# Patient Record
Sex: FEMALE | Race: WHITE | NOT HISPANIC OR LATINO | Employment: UNEMPLOYED | URBAN - METROPOLITAN AREA
[De-identification: names, ages, dates, MRNs, and addresses within clinical notes are randomized per-mention and may not be internally consistent; named-entity substitution may affect disease eponyms.]

---

## 2017-04-03 ENCOUNTER — TRANSFERRED RECORDS (OUTPATIENT)
Dept: HEALTH INFORMATION MANAGEMENT | Facility: CLINIC | Age: 67
End: 2017-04-03

## 2017-04-19 ENCOUNTER — MEDICAL CORRESPONDENCE (OUTPATIENT)
Dept: HEALTH INFORMATION MANAGEMENT | Facility: CLINIC | Age: 67
End: 2017-04-19

## 2018-04-04 ENCOUNTER — TRANSFERRED RECORDS (OUTPATIENT)
Dept: HEALTH INFORMATION MANAGEMENT | Facility: CLINIC | Age: 68
End: 2018-04-04

## 2018-04-11 ENCOUNTER — TRANSFERRED RECORDS (OUTPATIENT)
Dept: HEALTH INFORMATION MANAGEMENT | Facility: CLINIC | Age: 68
End: 2018-04-11

## 2018-04-13 ENCOUNTER — TRANSFERRED RECORDS (OUTPATIENT)
Dept: HEALTH INFORMATION MANAGEMENT | Facility: CLINIC | Age: 68
End: 2018-04-13

## 2018-04-14 ENCOUNTER — HOSPITAL ENCOUNTER (INPATIENT)
Facility: CLINIC | Age: 68
LOS: 1 days | Discharge: HOME | DRG: 287 | End: 2018-04-15
Attending: EMERGENCY MEDICINE | Admitting: INTERNAL MEDICINE
Payer: COMMERCIAL

## 2018-04-14 ENCOUNTER — APPOINTMENT (OUTPATIENT)
Dept: GENERAL RADIOLOGY | Facility: CLINIC | Age: 68
DRG: 287 | End: 2018-04-14
Attending: EMERGENCY MEDICINE
Payer: COMMERCIAL

## 2018-04-14 ENCOUNTER — APPOINTMENT (OUTPATIENT)
Dept: CARDIOLOGY | Facility: CLINIC | Age: 68
DRG: 287 | End: 2018-04-14
Attending: INTERNAL MEDICINE
Payer: COMMERCIAL

## 2018-04-14 DIAGNOSIS — R07.9 CHEST PAIN, UNSPECIFIED TYPE: ICD-10-CM

## 2018-04-14 DIAGNOSIS — I24.9 ACS (ACUTE CORONARY SYNDROME) (H): Primary | ICD-10-CM

## 2018-04-14 LAB
ANION GAP SERPL CALCULATED.3IONS-SCNC: 10 MMOL/L (ref 3–14)
BASOPHILS # BLD AUTO: 0 10E9/L (ref 0–0.2)
BASOPHILS NFR BLD AUTO: 0.3 %
BUN SERPL-MCNC: 8 MG/DL (ref 7–30)
CALCIUM SERPL-MCNC: 8.4 MG/DL (ref 8.5–10.1)
CHLORIDE SERPL-SCNC: 112 MMOL/L (ref 94–109)
CO2 SERPL-SCNC: 21 MMOL/L (ref 20–32)
CREAT SERPL-MCNC: 0.66 MG/DL (ref 0.52–1.04)
CRP SERPL-MCNC: <2.9 MG/L (ref 0–8)
DIFFERENTIAL METHOD BLD: NORMAL
EOSINOPHIL # BLD AUTO: 0.2 10E9/L (ref 0–0.7)
EOSINOPHIL NFR BLD AUTO: 2.5 %
ERYTHROCYTE [DISTWIDTH] IN BLOOD BY AUTOMATED COUNT: 12.6 % (ref 10–15)
GFR SERPL CREATININE-BSD FRML MDRD: 90 ML/MIN/1.7M2
GLUCOSE SERPL-MCNC: 105 MG/DL (ref 70–99)
HCT VFR BLD AUTO: 39.5 % (ref 35–47)
HGB BLD-MCNC: 12.9 G/DL (ref 11.7–15.7)
IMM GRANULOCYTES # BLD: 0 10E9/L (ref 0–0.4)
IMM GRANULOCYTES NFR BLD: 0.1 %
LYMPHOCYTES # BLD AUTO: 1.1 10E9/L (ref 0.8–5.3)
LYMPHOCYTES NFR BLD AUTO: 14.6 %
MCH RBC QN AUTO: 31.1 PG (ref 26.5–33)
MCHC RBC AUTO-ENTMCNC: 32.7 G/DL (ref 31.5–36.5)
MCV RBC AUTO: 95 FL (ref 78–100)
MONOCYTES # BLD AUTO: 0.7 10E9/L (ref 0–1.3)
MONOCYTES NFR BLD AUTO: 8.8 %
NEUTROPHILS # BLD AUTO: 5.6 10E9/L (ref 1.6–8.3)
NEUTROPHILS NFR BLD AUTO: 73.7 %
NRBC # BLD AUTO: 0 10*3/UL
NRBC BLD AUTO-RTO: 0 /100
NT-PROBNP SERPL-MCNC: 648 PG/ML (ref 0–900)
PLATELET # BLD AUTO: 261 10E9/L (ref 150–450)
POTASSIUM SERPL-SCNC: 3.8 MMOL/L (ref 3.4–5.3)
RBC # BLD AUTO: 4.15 10E12/L (ref 3.8–5.2)
SODIUM SERPL-SCNC: 143 MMOL/L (ref 133–144)
TROPONIN I BLD-MCNC: 1.38 UG/L (ref 0–0.1)
TROPONIN I SERPL-MCNC: 1.15 UG/L (ref 0–0.04)
TROPONIN I SERPL-MCNC: 1.34 UG/L (ref 0–0.04)
WBC # BLD AUTO: 7.6 10E9/L (ref 4–11)

## 2018-04-14 PROCEDURE — 25000128 H RX IP 250 OP 636: Performed by: INTERNAL MEDICINE

## 2018-04-14 PROCEDURE — 85025 COMPLETE CBC W/AUTO DIFF WBC: CPT | Performed by: EMERGENCY MEDICINE

## 2018-04-14 PROCEDURE — 93005 ELECTROCARDIOGRAM TRACING: CPT | Performed by: EMERGENCY MEDICINE

## 2018-04-14 PROCEDURE — 93010 ELECTROCARDIOGRAM REPORT: CPT | Mod: Z6 | Performed by: EMERGENCY MEDICINE

## 2018-04-14 PROCEDURE — 36415 COLL VENOUS BLD VENIPUNCTURE: CPT | Performed by: EMERGENCY MEDICINE

## 2018-04-14 PROCEDURE — 21400006 ZZH R&B CCU INTERMEDIATE UMMC

## 2018-04-14 PROCEDURE — 27210742 ZZH CATH CR1

## 2018-04-14 PROCEDURE — 27211089 ZZH KIT ACIST INJECTOR CR3

## 2018-04-14 PROCEDURE — 25000125 ZZHC RX 250: Performed by: INTERNAL MEDICINE

## 2018-04-14 PROCEDURE — C1894 INTRO/SHEATH, NON-LASER: HCPCS

## 2018-04-14 PROCEDURE — C1769 GUIDE WIRE: HCPCS

## 2018-04-14 PROCEDURE — 99285 EMERGENCY DEPT VISIT HI MDM: CPT | Mod: 25 | Performed by: EMERGENCY MEDICINE

## 2018-04-14 PROCEDURE — 99222 1ST HOSP IP/OBS MODERATE 55: CPT | Mod: 25 | Performed by: INTERNAL MEDICINE

## 2018-04-14 PROCEDURE — 83880 ASSAY OF NATRIURETIC PEPTIDE: CPT | Performed by: EMERGENCY MEDICINE

## 2018-04-14 PROCEDURE — 84484 ASSAY OF TROPONIN QUANT: CPT

## 2018-04-14 PROCEDURE — 93458 L HRT ARTERY/VENTRICLE ANGIO: CPT | Mod: 26 | Performed by: INTERNAL MEDICINE

## 2018-04-14 PROCEDURE — B2111ZZ FLUOROSCOPY OF MULTIPLE CORONARY ARTERIES USING LOW OSMOLAR CONTRAST: ICD-10-PCS | Performed by: INTERNAL MEDICINE

## 2018-04-14 PROCEDURE — 25000132 ZZH RX MED GY IP 250 OP 250 PS 637: Performed by: STUDENT IN AN ORGANIZED HEALTH CARE EDUCATION/TRAINING PROGRAM

## 2018-04-14 PROCEDURE — 27210982 ZZH KIT RT HC TOTES DISP CR7

## 2018-04-14 PROCEDURE — 93458 L HRT ARTERY/VENTRICLE ANGIO: CPT

## 2018-04-14 PROCEDURE — 27210787 ZZH MANIFOLD CR2

## 2018-04-14 PROCEDURE — 80048 BASIC METABOLIC PNL TOTAL CA: CPT | Performed by: EMERGENCY MEDICINE

## 2018-04-14 PROCEDURE — 71045 X-RAY EXAM CHEST 1 VIEW: CPT

## 2018-04-14 PROCEDURE — 86140 C-REACTIVE PROTEIN: CPT | Performed by: EMERGENCY MEDICINE

## 2018-04-14 PROCEDURE — 25000132 ZZH RX MED GY IP 250 OP 250 PS 637: Performed by: INTERNAL MEDICINE

## 2018-04-14 PROCEDURE — 84484 ASSAY OF TROPONIN QUANT: CPT | Performed by: EMERGENCY MEDICINE

## 2018-04-14 PROCEDURE — 93454 CORONARY ARTERY ANGIO S&I: CPT

## 2018-04-14 PROCEDURE — 99152 MOD SED SAME PHYS/QHP 5/>YRS: CPT

## 2018-04-14 PROCEDURE — 99285 EMERGENCY DEPT VISIT HI MDM: CPT | Performed by: EMERGENCY MEDICINE

## 2018-04-14 RX ORDER — FLUMAZENIL 0.1 MG/ML
0.2 INJECTION, SOLUTION INTRAVENOUS
Status: ACTIVE | OUTPATIENT
Start: 2018-04-14 | End: 2018-04-15

## 2018-04-14 RX ORDER — ASPIRIN 325 MG
325 TABLET ORAL
Status: DISCONTINUED | OUTPATIENT
Start: 2018-04-14 | End: 2018-04-14 | Stop reason: HOSPADM

## 2018-04-14 RX ORDER — TAMSULOSIN HYDROCHLORIDE 0.4 MG/1
0.4 CAPSULE ORAL DAILY
Status: DISCONTINUED | OUTPATIENT
Start: 2018-04-15 | End: 2018-04-15 | Stop reason: HOSPADM

## 2018-04-14 RX ORDER — HEPARIN SODIUM 1000 [USP'U]/ML
1000-10000 INJECTION, SOLUTION INTRAVENOUS; SUBCUTANEOUS EVERY 5 MIN PRN
Status: DISCONTINUED | OUTPATIENT
Start: 2018-04-14 | End: 2018-04-14 | Stop reason: HOSPADM

## 2018-04-14 RX ORDER — BUPROPION HYDROCHLORIDE 100 MG/1
75 TABLET, EXTENDED RELEASE ORAL
COMMUNITY
Start: 2017-05-01

## 2018-04-14 RX ORDER — ASPIRIN 81 MG/1
81 TABLET, CHEWABLE ORAL DAILY
Status: DISCONTINUED | OUTPATIENT
Start: 2018-04-15 | End: 2018-04-15 | Stop reason: HOSPADM

## 2018-04-14 RX ORDER — POTASSIUM CHLORIDE 29.8 MG/ML
20 INJECTION INTRAVENOUS
Status: DISCONTINUED | OUTPATIENT
Start: 2018-04-14 | End: 2018-04-14 | Stop reason: HOSPADM

## 2018-04-14 RX ORDER — PRASUGREL 10 MG/1
10-60 TABLET, FILM COATED ORAL
Status: DISCONTINUED | OUTPATIENT
Start: 2018-04-14 | End: 2018-04-14 | Stop reason: HOSPADM

## 2018-04-14 RX ORDER — DOPAMINE HYDROCHLORIDE 160 MG/100ML
2-20 INJECTION, SOLUTION INTRAVENOUS CONTINUOUS PRN
Status: DISCONTINUED | OUTPATIENT
Start: 2018-04-14 | End: 2018-04-14 | Stop reason: HOSPADM

## 2018-04-14 RX ORDER — POTASSIUM CHLORIDE 7.45 MG/ML
10 INJECTION INTRAVENOUS
Status: DISCONTINUED | OUTPATIENT
Start: 2018-04-14 | End: 2018-04-14 | Stop reason: HOSPADM

## 2018-04-14 RX ORDER — HYDRALAZINE HYDROCHLORIDE 20 MG/ML
10-20 INJECTION INTRAMUSCULAR; INTRAVENOUS
Status: DISCONTINUED | OUTPATIENT
Start: 2018-04-14 | End: 2018-04-14 | Stop reason: HOSPADM

## 2018-04-14 RX ORDER — TAMSULOSIN HYDROCHLORIDE 0.4 MG/1
0.4 CAPSULE ORAL
COMMUNITY
Start: 2016-01-25 | End: 2022-11-01

## 2018-04-14 RX ORDER — CLOPIDOGREL BISULFATE 75 MG/1
75 TABLET ORAL
Status: DISCONTINUED | OUTPATIENT
Start: 2018-04-14 | End: 2018-04-14 | Stop reason: HOSPADM

## 2018-04-14 RX ORDER — NALOXONE HYDROCHLORIDE 0.4 MG/ML
0.4 INJECTION, SOLUTION INTRAMUSCULAR; INTRAVENOUS; SUBCUTANEOUS EVERY 5 MIN PRN
Status: DISCONTINUED | OUTPATIENT
Start: 2018-04-14 | End: 2018-04-14 | Stop reason: HOSPADM

## 2018-04-14 RX ORDER — ALUMINA, MAGNESIA, AND SIMETHICONE 2400; 2400; 240 MG/30ML; MG/30ML; MG/30ML
30 SUSPENSION ORAL EVERY 4 HOURS PRN
Status: DISCONTINUED | OUTPATIENT
Start: 2018-04-14 | End: 2018-04-15 | Stop reason: HOSPADM

## 2018-04-14 RX ORDER — ACETAMINOPHEN 325 MG/1
650 TABLET ORAL EVERY 4 HOURS PRN
Status: DISCONTINUED | OUTPATIENT
Start: 2018-04-14 | End: 2018-04-15 | Stop reason: HOSPADM

## 2018-04-14 RX ORDER — ASPIRIN 81 MG/1
81 TABLET, CHEWABLE ORAL
COMMUNITY
Start: 2018-03-30

## 2018-04-14 RX ORDER — ASPIRIN 81 MG/1
324 TABLET, CHEWABLE ORAL ONCE
Status: CANCELLED | OUTPATIENT
Start: 2018-04-14 | End: 2018-04-14

## 2018-04-14 RX ORDER — FENTANYL CITRATE 50 UG/ML
25-50 INJECTION, SOLUTION INTRAMUSCULAR; INTRAVENOUS
Status: DISCONTINUED | OUTPATIENT
Start: 2018-04-14 | End: 2018-04-14 | Stop reason: HOSPADM

## 2018-04-14 RX ORDER — NITROGLYCERIN 20 MG/100ML
.07-2 INJECTION INTRAVENOUS CONTINUOUS PRN
Status: DISCONTINUED | OUTPATIENT
Start: 2018-04-14 | End: 2018-04-14 | Stop reason: HOSPADM

## 2018-04-14 RX ORDER — NICARDIPINE HYDROCHLORIDE 2.5 MG/ML
100 INJECTION INTRAVENOUS
Status: DISCONTINUED | OUTPATIENT
Start: 2018-04-14 | End: 2018-04-14 | Stop reason: HOSPADM

## 2018-04-14 RX ORDER — ATROPINE SULFATE 0.1 MG/ML
0.5 INJECTION INTRAVENOUS EVERY 5 MIN PRN
Status: ACTIVE | OUTPATIENT
Start: 2018-04-14 | End: 2018-04-15

## 2018-04-14 RX ORDER — ACETAMINOPHEN 650 MG/1
650 SUPPOSITORY RECTAL EVERY 4 HOURS PRN
Status: DISCONTINUED | OUTPATIENT
Start: 2018-04-14 | End: 2018-04-15 | Stop reason: HOSPADM

## 2018-04-14 RX ORDER — NITROGLYCERIN 0.4 MG/1
0.4 TABLET SUBLINGUAL
COMMUNITY
Start: 2018-04-11

## 2018-04-14 RX ORDER — CLOPIDOGREL BISULFATE 75 MG/1
300-600 TABLET ORAL
Status: DISCONTINUED | OUTPATIENT
Start: 2018-04-14 | End: 2018-04-14 | Stop reason: HOSPADM

## 2018-04-14 RX ORDER — ONDANSETRON 2 MG/ML
4 INJECTION INTRAMUSCULAR; INTRAVENOUS EVERY 4 HOURS PRN
Status: DISCONTINUED | OUTPATIENT
Start: 2018-04-14 | End: 2018-04-14 | Stop reason: HOSPADM

## 2018-04-14 RX ORDER — ATORVASTATIN CALCIUM 20 MG/1
20 TABLET, FILM COATED ORAL
COMMUNITY
Start: 2018-03-30 | End: 2021-11-23

## 2018-04-14 RX ORDER — NITROGLYCERIN 0.4 MG/1
0.4 TABLET SUBLINGUAL EVERY 5 MIN PRN
Status: DISCONTINUED | OUTPATIENT
Start: 2018-04-14 | End: 2018-04-14 | Stop reason: HOSPADM

## 2018-04-14 RX ORDER — DEXTROSE MONOHYDRATE 25 G/50ML
12.5-5 INJECTION, SOLUTION INTRAVENOUS EVERY 30 MIN PRN
Status: DISCONTINUED | OUTPATIENT
Start: 2018-04-14 | End: 2018-04-14 | Stop reason: HOSPADM

## 2018-04-14 RX ORDER — METOPROLOL TARTRATE 1 MG/ML
5 INJECTION, SOLUTION INTRAVENOUS EVERY 5 MIN PRN
Status: DISCONTINUED | OUTPATIENT
Start: 2018-04-14 | End: 2018-04-14 | Stop reason: HOSPADM

## 2018-04-14 RX ORDER — VERAPAMIL HYDROCHLORIDE 2.5 MG/ML
1-5 INJECTION, SOLUTION INTRAVENOUS
Status: DISCONTINUED | OUTPATIENT
Start: 2018-04-14 | End: 2018-04-14 | Stop reason: HOSPADM

## 2018-04-14 RX ORDER — BUPROPION HYDROCHLORIDE 100 MG/1
100 TABLET, EXTENDED RELEASE ORAL DAILY
Status: DISCONTINUED | OUTPATIENT
Start: 2018-04-15 | End: 2018-04-15 | Stop reason: HOSPADM

## 2018-04-14 RX ORDER — NITROGLYCERIN 5 MG/ML
100-200 VIAL (ML) INTRAVENOUS
Status: DISCONTINUED | OUTPATIENT
Start: 2018-04-14 | End: 2018-04-14 | Stop reason: HOSPADM

## 2018-04-14 RX ORDER — LIDOCAINE HYDROCHLORIDE 10 MG/ML
30 INJECTION, SOLUTION EPIDURAL; INFILTRATION; INTRACAUDAL; PERINEURAL
Status: DISCONTINUED | OUTPATIENT
Start: 2018-04-14 | End: 2018-04-14 | Stop reason: HOSPADM

## 2018-04-14 RX ORDER — SODIUM NITROPRUSSIDE 25 MG/ML
100-200 INJECTION INTRAVENOUS
Status: DISCONTINUED | OUTPATIENT
Start: 2018-04-14 | End: 2018-04-14 | Stop reason: HOSPADM

## 2018-04-14 RX ORDER — ARGATROBAN 1 MG/ML
150 INJECTION, SOLUTION INTRAVENOUS
Status: DISCONTINUED | OUTPATIENT
Start: 2018-04-14 | End: 2018-04-14 | Stop reason: HOSPADM

## 2018-04-14 RX ORDER — ATORVASTATIN CALCIUM 20 MG/1
20 TABLET, FILM COATED ORAL AT BEDTIME
Status: DISCONTINUED | OUTPATIENT
Start: 2018-04-14 | End: 2018-04-15 | Stop reason: HOSPADM

## 2018-04-14 RX ORDER — LIDOCAINE 40 MG/G
CREAM TOPICAL
Status: DISCONTINUED | OUTPATIENT
Start: 2018-04-14 | End: 2018-04-15 | Stop reason: HOSPADM

## 2018-04-14 RX ORDER — FLUMAZENIL 0.1 MG/ML
0.2 INJECTION, SOLUTION INTRAVENOUS
Status: DISCONTINUED | OUTPATIENT
Start: 2018-04-14 | End: 2018-04-14 | Stop reason: HOSPADM

## 2018-04-14 RX ORDER — CLOPIDOGREL BISULFATE 75 MG/1
600 TABLET ORAL ONCE
Status: COMPLETED | OUTPATIENT
Start: 2018-04-14 | End: 2018-04-14

## 2018-04-14 RX ORDER — NITROGLYCERIN 5 MG/ML
100-500 VIAL (ML) INTRAVENOUS
Status: DISCONTINUED | OUTPATIENT
Start: 2018-04-14 | End: 2018-04-14 | Stop reason: HOSPADM

## 2018-04-14 RX ORDER — ENALAPRILAT 1.25 MG/ML
1.25-2.5 INJECTION INTRAVENOUS
Status: DISCONTINUED | OUTPATIENT
Start: 2018-04-14 | End: 2018-04-14 | Stop reason: HOSPADM

## 2018-04-14 RX ORDER — ASPIRIN 325 MG
325 TABLET ORAL DAILY
Status: CANCELLED | OUTPATIENT
Start: 2018-04-15

## 2018-04-14 RX ORDER — DALFAMPRIDINE 10 MG/1
10 TABLET, FILM COATED, EXTENDED RELEASE ORAL 2 TIMES DAILY
Status: DISCONTINUED | OUTPATIENT
Start: 2018-04-14 | End: 2018-04-15 | Stop reason: HOSPADM

## 2018-04-14 RX ORDER — LIDOCAINE 40 MG/G
CREAM TOPICAL
Status: DISCONTINUED | OUTPATIENT
Start: 2018-04-14 | End: 2018-04-14

## 2018-04-14 RX ORDER — ASPIRIN 81 MG/1
81 TABLET, CHEWABLE ORAL DAILY
Status: CANCELLED | OUTPATIENT
Start: 2018-04-14

## 2018-04-14 RX ORDER — FENTANYL CITRATE 50 UG/ML
25-50 INJECTION, SOLUTION INTRAMUSCULAR; INTRAVENOUS
Status: ACTIVE | OUTPATIENT
Start: 2018-04-14 | End: 2018-04-15

## 2018-04-14 RX ORDER — EPTIFIBATIDE 2 MG/ML
2 INJECTION, SOLUTION INTRAVENOUS CONTINUOUS PRN
Status: DISCONTINUED | OUTPATIENT
Start: 2018-04-14 | End: 2018-04-14 | Stop reason: HOSPADM

## 2018-04-14 RX ORDER — ARGATROBAN 1 MG/ML
350 INJECTION, SOLUTION INTRAVENOUS
Status: DISCONTINUED | OUTPATIENT
Start: 2018-04-14 | End: 2018-04-14 | Stop reason: HOSPADM

## 2018-04-14 RX ORDER — PROTAMINE SULFATE 10 MG/ML
25-100 INJECTION, SOLUTION INTRAVENOUS EVERY 5 MIN PRN
Status: DISCONTINUED | OUTPATIENT
Start: 2018-04-14 | End: 2018-04-14 | Stop reason: HOSPADM

## 2018-04-14 RX ORDER — NALOXONE HYDROCHLORIDE 0.4 MG/ML
.1-.4 INJECTION, SOLUTION INTRAMUSCULAR; INTRAVENOUS; SUBCUTANEOUS
Status: DISCONTINUED | OUTPATIENT
Start: 2018-04-14 | End: 2018-04-15 | Stop reason: HOSPADM

## 2018-04-14 RX ORDER — ACETAMINOPHEN 160 MG
2000 TABLET,DISINTEGRATING ORAL
COMMUNITY

## 2018-04-14 RX ORDER — PROTAMINE SULFATE 10 MG/ML
1-5 INJECTION, SOLUTION INTRAVENOUS
Status: DISCONTINUED | OUTPATIENT
Start: 2018-04-14 | End: 2018-04-14 | Stop reason: HOSPADM

## 2018-04-14 RX ORDER — LORAZEPAM 2 MG/ML
.5-2 INJECTION INTRAMUSCULAR EVERY 4 HOURS PRN
Status: DISCONTINUED | OUTPATIENT
Start: 2018-04-14 | End: 2018-04-14 | Stop reason: HOSPADM

## 2018-04-14 RX ORDER — LOSARTAN POTASSIUM 50 MG/1
100 TABLET ORAL
COMMUNITY
Start: 2015-12-24

## 2018-04-14 RX ORDER — ATORVASTATIN CALCIUM 20 MG/1
20 TABLET, FILM COATED ORAL DAILY
Status: DISCONTINUED | OUTPATIENT
Start: 2018-04-15 | End: 2018-04-14

## 2018-04-14 RX ORDER — EPINEPHRINE 1 MG/ML
0.3 INJECTION, SOLUTION, CONCENTRATE INTRAVENOUS
Status: DISCONTINUED | OUTPATIENT
Start: 2018-04-14 | End: 2018-04-14 | Stop reason: HOSPADM

## 2018-04-14 RX ORDER — NIFEDIPINE 10 MG/1
10 CAPSULE ORAL
Status: DISCONTINUED | OUTPATIENT
Start: 2018-04-14 | End: 2018-04-14 | Stop reason: HOSPADM

## 2018-04-14 RX ORDER — DALFAMPRIDINE 10 MG/1
TABLET, FILM COATED, EXTENDED RELEASE ORAL
COMMUNITY
Start: 2018-04-04 | End: 2022-03-23

## 2018-04-14 RX ORDER — NALOXONE HYDROCHLORIDE 0.4 MG/ML
.2-.4 INJECTION, SOLUTION INTRAMUSCULAR; INTRAVENOUS; SUBCUTANEOUS
Status: ACTIVE | OUTPATIENT
Start: 2018-04-14 | End: 2018-04-15

## 2018-04-14 RX ORDER — IOPAMIDOL 755 MG/ML
20 INJECTION, SOLUTION INTRAVASCULAR ONCE
Status: COMPLETED | OUTPATIENT
Start: 2018-04-14 | End: 2018-04-14

## 2018-04-14 RX ORDER — MAGNESIUM HYDROXIDE 1200 MG/15ML
1000 LIQUID ORAL CONTINUOUS PRN
Status: DISCONTINUED | OUTPATIENT
Start: 2018-04-14 | End: 2018-04-14 | Stop reason: HOSPADM

## 2018-04-14 RX ORDER — EPTIFIBATIDE 2 MG/ML
180 INJECTION, SOLUTION INTRAVENOUS EVERY 10 MIN PRN
Status: DISCONTINUED | OUTPATIENT
Start: 2018-04-14 | End: 2018-04-14 | Stop reason: HOSPADM

## 2018-04-14 RX ORDER — FENTANYL CITRATE 50 UG/ML
25-50 INJECTION, SOLUTION INTRAMUSCULAR; INTRAVENOUS
Status: DISCONTINUED | OUTPATIENT
Start: 2018-04-14 | End: 2018-04-14 | Stop reason: CLARIF

## 2018-04-14 RX ORDER — DOBUTAMINE HYDROCHLORIDE 200 MG/100ML
2-20 INJECTION INTRAVENOUS CONTINUOUS PRN
Status: DISCONTINUED | OUTPATIENT
Start: 2018-04-14 | End: 2018-04-14 | Stop reason: HOSPADM

## 2018-04-14 RX ORDER — NALOXONE HYDROCHLORIDE 0.4 MG/ML
.1-.4 INJECTION, SOLUTION INTRAMUSCULAR; INTRAVENOUS; SUBCUTANEOUS
Status: DISCONTINUED | OUTPATIENT
Start: 2018-04-14 | End: 2018-04-14

## 2018-04-14 RX ORDER — ASPIRIN 81 MG/1
81-324 TABLET, CHEWABLE ORAL
Status: DISCONTINUED | OUTPATIENT
Start: 2018-04-14 | End: 2018-04-14 | Stop reason: HOSPADM

## 2018-04-14 RX ORDER — ADENOSINE 3 MG/ML
12-12000 INJECTION, SOLUTION INTRAVENOUS
Status: DISCONTINUED | OUTPATIENT
Start: 2018-04-14 | End: 2018-04-14 | Stop reason: HOSPADM

## 2018-04-14 RX ORDER — VITAMIN E 268 MG
CAPSULE ORAL
COMMUNITY
Start: 2010-05-27 | End: 2021-11-23

## 2018-04-14 RX ORDER — PHENYLEPHRINE HCL IN 0.9% NACL 1 MG/10 ML
20-100 SYRINGE (ML) INTRAVENOUS
Status: DISCONTINUED | OUTPATIENT
Start: 2018-04-14 | End: 2018-04-14 | Stop reason: HOSPADM

## 2018-04-14 RX ORDER — DIPHENHYDRAMINE HYDROCHLORIDE 50 MG/ML
25-50 INJECTION INTRAMUSCULAR; INTRAVENOUS
Status: DISCONTINUED | OUTPATIENT
Start: 2018-04-14 | End: 2018-04-14 | Stop reason: HOSPADM

## 2018-04-14 RX ORDER — METHYLPREDNISOLONE SODIUM SUCCINATE 125 MG/2ML
125 INJECTION, POWDER, LYOPHILIZED, FOR SOLUTION INTRAMUSCULAR; INTRAVENOUS
Status: DISCONTINUED | OUTPATIENT
Start: 2018-04-14 | End: 2018-04-14 | Stop reason: HOSPADM

## 2018-04-14 RX ORDER — FUROSEMIDE 10 MG/ML
20-100 INJECTION INTRAMUSCULAR; INTRAVENOUS
Status: DISCONTINUED | OUTPATIENT
Start: 2018-04-14 | End: 2018-04-14 | Stop reason: HOSPADM

## 2018-04-14 RX ORDER — ATROPINE SULFATE 0.1 MG/ML
.5-1 INJECTION INTRAVENOUS
Status: DISCONTINUED | OUTPATIENT
Start: 2018-04-14 | End: 2018-04-14 | Stop reason: HOSPADM

## 2018-04-14 RX ORDER — CLOPIDOGREL BISULFATE 75 MG/1
75 TABLET ORAL AT BEDTIME
Status: DISCONTINUED | OUTPATIENT
Start: 2018-04-15 | End: 2018-04-15 | Stop reason: HOSPADM

## 2018-04-14 RX ORDER — PROMETHAZINE HYDROCHLORIDE 25 MG/ML
6.25-25 INJECTION, SOLUTION INTRAMUSCULAR; INTRAVENOUS EVERY 4 HOURS PRN
Status: DISCONTINUED | OUTPATIENT
Start: 2018-04-14 | End: 2018-04-14 | Stop reason: HOSPADM

## 2018-04-14 RX ADMIN — FENTANYL CITRATE 50 MCG: 50 INJECTION, SOLUTION INTRAMUSCULAR; INTRAVENOUS at 20:23

## 2018-04-14 RX ADMIN — DALFAMPRIDINE 10 MG: 10 TABLET, FILM COATED, EXTENDED RELEASE ORAL at 23:51

## 2018-04-14 RX ADMIN — CLOPIDOGREL 600 MG: 75 TABLET, FILM COATED ORAL at 23:51

## 2018-04-14 RX ADMIN — MIDAZOLAM 1 MG: 1 INJECTION INTRAMUSCULAR; INTRAVENOUS at 19:56

## 2018-04-14 RX ADMIN — MIDAZOLAM 1 MG: 1 INJECTION INTRAMUSCULAR; INTRAVENOUS at 19:47

## 2018-04-14 RX ADMIN — FENTANYL CITRATE 50 MCG: 50 INJECTION, SOLUTION INTRAMUSCULAR; INTRAVENOUS at 19:48

## 2018-04-14 RX ADMIN — HEPARIN SODIUM 1500 UNITS: 1000 INJECTION, SOLUTION INTRAVENOUS; SUBCUTANEOUS at 19:58

## 2018-04-14 RX ADMIN — ATORVASTATIN CALCIUM 20 MG: 20 TABLET, FILM COATED ORAL at 23:51

## 2018-04-14 RX ADMIN — IOPAMIDOL 20 ML: 755 INJECTION, SOLUTION INTRAVASCULAR at 20:15

## 2018-04-14 ASSESSMENT — ENCOUNTER SYMPTOMS
DIARRHEA: 0
CHILLS: 0
HEADACHES: 0
FLANK PAIN: 0
PALPITATIONS: 0
WHEEZING: 0
SHORTNESS OF BREATH: 0
LIGHT-HEADEDNESS: 0
NAUSEA: 0
VOMITING: 0
ABDOMINAL PAIN: 0
COUGH: 0
FEVER: 0
DYSURIA: 0

## 2018-04-14 NOTE — IP AVS SNAPSHOT
[address] [address]    Phone:  [phone]                                       After Visit Summary   4/14/2018    Vivien Gilbert    MRN: [id number]           After Visit Summary Signature Page     I have received my discharge instructions, and my questions have been answered. I have discussed any challenges I see with this plan with the nurse or doctor.    ..........................................................................................................................................  Patient/Patient Representative Signature      ..........................................................................................................................................  Patient Representative Print Name and Relationship to Patient    ..................................................               ................................................  Date                                            Time    ..........................................................................................................................................  Reviewed by Signature/Title    ...................................................              ..............................................  Date                                                            Time

## 2018-04-14 NOTE — ED NOTES
67-yr female patient - Presenting C/o:  Ongoing chest pain; radiates to left; states she had angioplasty with 1x stent placed in LAD, yesterday, at [facility], [place], [place].  Patient called her cardiology team at [facility]; they referred her to [facility]ED to get an EKG and further work-up, management.

## 2018-04-14 NOTE — IP AVS SNAPSHOT
MRN:[id number]                      After Visit Summary   4/14/2018    Vivien Gilbert    MRN: [id number]           Thank you!     Thank you for choosing [facility] for your care. Our goal is always to provide you with excellent care. Hearing back from our patients is one way we can continue to improve our services. Please take a few minutes to complete the written survey that you may receive in the mail after you visit with us. Thank you!        Patient Information     Date Of Birth          1950        Designated Caregiver       Most Recent Value    Caregiver    Will someone help with your care after discharge? yes    Name of designated caregiver Jay Lares    Phone number of caregiver [phone]    Caregiver address [address], [facility] [address]      About your hospital stay     You were admitted on:  April 14, 2018 You last received care in the:  [address] [facility]    You were discharged on:  April 15, 2018        Reason for your hospital stay       Chest pain, shortness of breath                  Who to Call     For medical emergencies, please call 911.  For non-urgent questions about your medical care, please call your primary care provider or clinic, [phone]          Attending Provider     Provider Specialty    Nahum Blackwood MD Emergency Medicine    Jasmeet Gregg MD Emergency Medicine    Jake Hutchison MD Cardiology       Primary Care Provider Office Phone # Fax #    Matheus Tomas [phone]      After Care Instructions     Activity       Your activity upon discharge: activity as tolerated.  No heavy lifting 1 week            Diet       Follow this diet upon discharge: Low Saturated Fat Na <2400mg Diet                  Follow-up Appointments     Follow Up and recommended labs and tests       Follow up with primary care provider, Matheus Tomas, within 4 weeks, for hospital follow- up. No follow up labs or test are needed.    Follow up with  cardiologist at [facility].  Continue with originally planned cardiac rehab.                  Further instructions from your care team         Going home after an Angiogram    PROCEDURE SITE:  It is normal to have soreness and small bruising at the puncture site as well as mild tingling. You may shower but please do not use a hot tub, bath tub or pool for 2 days. Do not apply any lotion or powder near the site for 3 days. For 1 week no lifting >15 pounds.    ACTIVITY:  Keep in mind everyone will recover at a different pace. This can be related to your activity level prior to the heart attack as well as how much damage your heart attack caused. Eventually the goal per the American Heart Association is 30 minutes of moderate exercise 5 times a week. In the first 2 weeks it is best for all patient to avoid strenuous/vigorous exercise including sex.    MEDICATIONS:  1. You have begun Plavix (clopidogrel), Effient (prasugrel) or Brilinta (ticagrelor). This medication is essential for your stent(s). Do not stop it without speaking first to your heart doctor or their nurse- this includes if you have concerns about side effects or cost. Also, if another health provider tells you to stop it, let them know they need to discuss it with your heart doctor.   3. If you have not been continued on other medications you were previously taking at home it is probably for reason though please discuss your concerns with any of your doctors.     DIET:  We recommend a diet low in saturated fat, trans fat and cholesterol. In addition it will be helpful to be cautious of sodium intake and carbohydrates. Try to increase the amount of lean meats you eat like fish and chicken, but avoid frying; and reduce the amount of red meat you eat. Eat more fresh fruits and vegetables and try to avoid canned and processed food. Please reference the handouts you received for more specific information.    CARDIAC REHAB:  After the initial healing  process of the procedure site, we recommend cardiac rehabilitation for all heart attack and stent patients. Cardiac rehabilitation will help you:  - Rebuild stamina, strength and balance.  - Learn how to participate in activities safely, as well as help you regain confidence to do so.  - Return to activities of daily living and leisure.  You should receive a call from them within the next week, but if you do not or you would like to call you can contact their central scheduling at [phone]    OTHER INFORMATION:  1. Consider having your family members learn CPR if they do not know it already.  2. It's not uncommon for heart attack sufferers to experience feelings of depression, anxiety or denial. Please do not be afraid to discuss these symptoms with any of your health providers at any point in your recovery.  3. If you are a smoker, quitting smoking will be one of the most important things you can do for yourself. There are nicotine replacement options or medications they might be able to be prescribed. Please discuss this with your doctors. Consider calling the QuitPlan at 7-277-189-FDOI ([phone]) as they can offer ongoing support after discharge.    CALL YOUR DOCTOR IF:  -You have a large or growing lump/bump around the procedure site  -The site is red, swollen, hot, tender or has drainage  -You have hives, a rash or unusual itching  -You have increasing or worsening shortness of breath or chest pain    FOLLOW UP:  We prefer you to follow up with your primary care provider within one week. You will be arranged to see the cardiologist (heart doctor) in clinic in about one month.     Should you need to contact us:  Cardiology clinic for scheduling or triage nurse questions/concerns:  [phone]              Pending Results     Date and Time Order Name Status Description    4/15/2018 0813 EKG 12-lead, tracing only Preliminary             Statement of Approval     Ordered          04/15/18 1302  I have reviewed and  "agree with all the recommendations and orders detailed in this document.  EFFECTIVE NOW     Approved and electronically signed by:  Sean Mejía MD             Admission Information     Date & Time Provider Department Dept. Phone    2018 Jake Hutchison MD [address] [facility] [phone]      Your Vitals Were     Blood Pressure Pulse Temperature Respirations Height Weight    133/59 (BP Location: Right arm) 60 98.1  F (36.7  C) (Oral) 18 1.626 m (5' 4\") 76.6 kg (168 lb 12.8 oz)    Pulse Oximetry BMI (Body Mass Index)                95% 28.97 kg/m2          MyChart Information     [employer] lets you send messages to your doctor, view your test results, renew your prescriptions, schedule appointments and more. To sign up, go to [email or web address] . Click on \"Log in\" on the left side of the screen, which will take you to the Welcome page. Then click on \"Sign up Now\" on the right side of the page.     You will be asked to enter the access code listed below, as well as some personal information. Please follow the directions to create your username and password.     Your access code is: [email or web address]  Expires: 2018  9:26 AM     Your access code will  in 90 days. If you need help or a new code, please call your [facility] clinic or [phone].        Care EveryWhere ID     This is your Care EveryWhere ID. This could be used by other organizations to access your [facility] medical records  [id number]        Equal Access to Services     [facility]: Hadii christoph carreno Soreji, waaxda luqadaha, qaybta kaalmaedgar teixeira . So [facility] [phone].    ATENCIÓN: Si habla español, tiene a frausto disposición servicios gratuitos de asistencia lingüística. Llame al [phone].    We comply with applicable federal civil rights laws and [place] laws. We do not discriminate on the basis of race, color, national origin, age, disability, sex, sexual orientation, " or gender identity.               Review of your medicines      START taking        Dose / Directions    clopidogrel 75 MG tablet   Commonly known as:  PLAVIX        Dose:  75 mg   Take 1 tablet (75 mg) by mouth At Bedtime   Quantity:  90 tablet   Refills:  3         CONTINUE these medicines which have NOT CHANGED        Dose / Directions    aspirin 81 MG chewable tablet        Dose:  81 mg   Take 81 mg by mouth   Refills:  0       atorvastatin 20 MG tablet   Commonly known as:  LIPITOR        Dose:  20 mg   Take 20 mg by mouth   Refills:  0       buPROPion 100 MG 12 hr tablet   Commonly known as:  WELLBUTRIN SR        Dose:  100 mg   Take 100 mg by mouth   Refills:  0       Dalfampridine 10 MG Tb12        Refills:  0       flaxseed oil 1000 MG Caps        As directed once daily.   Refills:  0       losartan 50 MG tablet   Commonly known as:  COZAAR        Dose:  50 mg   Take 50 mg by mouth   Refills:  0       nitroGLYcerin 0.4 MG sublingual tablet   Commonly known as:  NITROSTAT        Dose:  0.4 mg   Place 0.4 mg under the tongue   Refills:  0       tamsulosin 0.4 MG capsule   Commonly known as:  FLOMAX        Dose:  0.4 mg   Take 0.4 mg by mouth   Refills:  0       vitamin D3 2000 units Caps        Dose:  2000 Units   Take 2,000 Units by mouth   Refills:  0         STOP taking     ticagrelor 90 MG tablet   Commonly known as:  BRILINTA                Where to get your medicines      These medications were sent to [facility] [place], [place] - [address] [address]     Phone:  [phone]     clopidogrel 75 MG tablet                Protect others around you: Learn how to safely use, store and throw away your medicines at www.disposemymeds.org.             Medication List: This is a list of all your medications and when to take them. Check marks below indicate your daily home schedule. Keep this list as a reference.      Medications           Morning  Afternoon Evening Bedtime As Needed    aspirin 81 MG chewable tablet   Take 81 mg by mouth   Last time this was given:  81 mg on 4/15/2018  7:48 AM                                atorvastatin 20 MG tablet   Commonly known as:  LIPITOR   Take 20 mg by mouth   Last time this was given:  20 mg on 4/14/2018 11:51 PM                                buPROPion 100 MG 12 hr tablet   Commonly known as:  WELLBUTRIN SR   Take 100 mg by mouth   Last time this was given:  100 mg on 4/15/2018  7:48 AM                                clopidogrel 75 MG tablet   Commonly known as:  PLAVIX   Take 1 tablet (75 mg) by mouth At Bedtime   Last time this was given:  600 mg on 4/14/2018 11:51 PM                                Dalfampridine 10 MG Tb12   Last time this was given:  10 mg on 4/14/2018 11:51 PM                                flaxseed oil 1000 MG Caps   As directed once daily.                                losartan 50 MG tablet   Commonly known as:  COZAAR   Take 50 mg by mouth                                nitroGLYcerin 0.4 MG sublingual tablet   Commonly known as:  NITROSTAT   Place 0.4 mg under the tongue                                tamsulosin 0.4 MG capsule   Commonly known as:  FLOMAX   Take 0.4 mg by mouth   Last time this was given:  0.4 mg on 4/15/2018  7:48 AM                                vitamin D3 2000 units Caps   Take 2,000 Units by mouth

## 2018-04-14 NOTE — ED PROVIDER NOTES
"  History     Chief Complaint   Patient presents with     Chest Pain     s/p, yesterday:  angioplasty with 1 stent placement in LAD     HPI  67-year-old female with history of CAD status post stent to LAD yesterday arriving today to the emergency department for evaluation of chest pain.  The patient states she has had intermittent angina for several months, and yesterday underwent stent at [facility].  Postprocedure the patient reported some worsening of pain up to a maximum of 7 of 10 currently 4 of 10 centralized described as pressure.  She reports no new trauma.  She reports no new development of shortness of breath, fever, chills, cough.  She reports pain is somewhat different than pain preceding stent.  Today she called the on-call cardiologist at [facility] and she was referred to the closest hospital thus now arriving to the emergency department.    I have reviewed the Medications, Allergies, Past Medical and Surgical History, and Social History in the Epic system.    Review of Systems   Constitutional: Negative for chills and fever.   Respiratory: Negative for cough, shortness of breath and wheezing.    Cardiovascular: Positive for chest pain. Negative for palpitations.   Gastrointestinal: Negative for abdominal pain, diarrhea, nausea and vomiting.   Genitourinary: Negative for dysuria and flank pain.   Neurological: Negative for light-headedness and headaches.   All other systems reviewed and are negative.      Physical Exam   BP: 120/55  Heart Rate: 58  Temp: 97.9  F (36.6  C)  Resp: 18  Height: 162.6 cm (5' 4\")  Weight: 72.6 kg (160 lb)  SpO2: 97 %      Physical Exam   Constitutional: She appears well-developed.   HENT:   Head: Normocephalic and atraumatic.   Mouth/Throat: Oropharynx is clear and moist.   Eyes: Pupils are equal, round, and reactive to light.   Cardiovascular: Normal rate, regular rhythm and normal heart sounds.    Pulmonary/Chest: Effort normal. No respiratory distress. She has no " wheezes. She has no rales. She exhibits no tenderness.   Abdominal: Soft. She exhibits no distension. There is no tenderness. There is no rebound.   Neurological: She is alert. No cranial nerve deficit.   Skin: Skin is warm. No rash noted. She is not diaphoretic.   Psychiatric: She has a normal mood and affect.       ED Course     ED Course     Procedures             EKG Interpretation:      Interpreted by Nahum Blackwood  Time reviewed: 1240  Symptoms at time of EKG: chest pain   Rhythm: normal sinus   Rate: bradycardia  Axis: normal  Ectopy: none  Conduction: normal  ST Segments/ T Waves: No ST-T wave changes  Q Waves: none  Comparison to prior: No old EKG available    Clinical Impression: normal EKG         Labs Ordered and Resulted from Time of ED Arrival Up to the Time of Departure from the ED   BASIC METABOLIC PANEL - Abnormal; Notable for the following:        Result Value    Chloride 112 (*)     Glucose 105 (*)     Calcium 8.4 (*)     All other components within normal limits   TROPONIN POCT - Abnormal; Notable for the following:     Troponin I 1.38 (*)     All other components within normal limits   CBC WITH PLATELETS DIFFERENTIAL   TROPONIN I   ISTAT TROPONIN NURSING POCT            Assessments & Plan (with Medical Decision Making)     67-year-old female history of CAD status post stent to LAD yesterday arriving today to the emergency department for evaluation of chest pain.  Upon arrival this patient is noted to be alert, afebrile, and hemodynamically stable.  Stat EKG was obtained demonstrating a sinus bradycardia with rate of 59 with no sign of strain or ischemic type pattern.  We did obtain a portable chest x-ray which reveals no obvious pathology to explain patient's symptoms.  She is currently speaking in full sentences without evidence of increased work of breathing or cardiovascular compromise.  Etiology is unclear my suspicion at this time for aortic pathology, pneumothorax, development of pleural  or pericardial effusion would be low.  Troponin is elevated to 1.38 without baseline.  Etiology of symptoms is unclear with his secondary to reperfusion injury or reocclusion as possibilities.  Case was discussed with our cardiologist who have evaluated the patient in the emergency department with recommendations for repeat troponin and EKG at 3 hours.  This ultimately demonstrates a slight down trend of troponin with no acute EKG changes.  However with concern of ongoing chest pain consent was obtained to re-proceed to the Cath Lab for angiography to evaluate for stent patency.    I have reviewed the nursing notes.    I have reviewed the findings, diagnosis, plan and need for follow up with the patient.    New Prescriptions    No medications on file       Final diagnoses:   Chest pain, unspecified type       4/14/2018   [facility], [place], EMERGENCY DEPARTMENT     Nahum Blackwood MD  04/15/18 4389

## 2018-04-15 VITALS
RESPIRATION RATE: 18 BRPM | HEART RATE: 60 BPM | TEMPERATURE: 98.1 F | WEIGHT: 168.8 LBS | OXYGEN SATURATION: 95 % | SYSTOLIC BLOOD PRESSURE: 133 MMHG | DIASTOLIC BLOOD PRESSURE: 59 MMHG | BODY MASS INDEX: 28.82 KG/M2 | HEIGHT: 64 IN

## 2018-04-15 LAB
ANION GAP SERPL CALCULATED.3IONS-SCNC: 7 MMOL/L (ref 3–14)
BUN SERPL-MCNC: 8 MG/DL (ref 7–30)
CALCIUM SERPL-MCNC: 8.3 MG/DL (ref 8.5–10.1)
CHLORIDE SERPL-SCNC: 112 MMOL/L (ref 94–109)
CO2 SERPL-SCNC: 22 MMOL/L (ref 20–32)
CREAT SERPL-MCNC: 0.68 MG/DL (ref 0.52–1.04)
ERYTHROCYTE [DISTWIDTH] IN BLOOD BY AUTOMATED COUNT: 12.8 % (ref 10–15)
GFR SERPL CREATININE-BSD FRML MDRD: 87 ML/MIN/1.7M2
GLUCOSE SERPL-MCNC: 95 MG/DL (ref 70–99)
HCT VFR BLD AUTO: 37.2 % (ref 35–47)
HGB BLD-MCNC: 11.9 G/DL (ref 11.7–15.7)
INTERPRETATION ECG - MUSE: NORMAL
INTERPRETATION ECG - MUSE: NORMAL
MCH RBC QN AUTO: 30.4 PG (ref 26.5–33)
MCHC RBC AUTO-ENTMCNC: 32 G/DL (ref 31.5–36.5)
MCV RBC AUTO: 95 FL (ref 78–100)
PLATELET # BLD AUTO: 224 10E9/L (ref 150–450)
POTASSIUM SERPL-SCNC: 3.7 MMOL/L (ref 3.4–5.3)
RBC # BLD AUTO: 3.91 10E12/L (ref 3.8–5.2)
SODIUM SERPL-SCNC: 141 MMOL/L (ref 133–144)
WBC # BLD AUTO: 7.2 10E9/L (ref 4–11)

## 2018-04-15 PROCEDURE — 25000132 ZZH RX MED GY IP 250 OP 250 PS 637: Performed by: INTERNAL MEDICINE

## 2018-04-15 PROCEDURE — 25000132 ZZH RX MED GY IP 250 OP 250 PS 637: Performed by: STUDENT IN AN ORGANIZED HEALTH CARE EDUCATION/TRAINING PROGRAM

## 2018-04-15 PROCEDURE — 99238 HOSP IP/OBS DSCHRG MGMT 30/<: CPT | Mod: 24 | Performed by: INTERNAL MEDICINE

## 2018-04-15 PROCEDURE — 93005 ELECTROCARDIOGRAM TRACING: CPT

## 2018-04-15 PROCEDURE — 85027 COMPLETE CBC AUTOMATED: CPT | Performed by: STUDENT IN AN ORGANIZED HEALTH CARE EDUCATION/TRAINING PROGRAM

## 2018-04-15 PROCEDURE — 93010 ELECTROCARDIOGRAM REPORT: CPT | Performed by: INTERNAL MEDICINE

## 2018-04-15 PROCEDURE — 80048 BASIC METABOLIC PNL TOTAL CA: CPT | Performed by: STUDENT IN AN ORGANIZED HEALTH CARE EDUCATION/TRAINING PROGRAM

## 2018-04-15 PROCEDURE — 36415 COLL VENOUS BLD VENIPUNCTURE: CPT | Performed by: STUDENT IN AN ORGANIZED HEALTH CARE EDUCATION/TRAINING PROGRAM

## 2018-04-15 RX ORDER — CLOPIDOGREL BISULFATE 75 MG/1
75 TABLET ORAL AT BEDTIME
Qty: 90 TABLET | Refills: 3 | Status: SHIPPED | OUTPATIENT
Start: 2018-04-15 | End: 2021-11-23

## 2018-04-15 RX ADMIN — BUPROPION HYDROCHLORIDE 100 MG: 100 TABLET, FILM COATED, EXTENDED RELEASE ORAL at 07:48

## 2018-04-15 RX ADMIN — ASPIRIN 81 MG CHEWABLE TABLET 81 MG: 81 TABLET CHEWABLE at 07:48

## 2018-04-15 RX ADMIN — TAMSULOSIN HYDROCHLORIDE 0.4 MG: 0.4 CAPSULE ORAL at 07:48

## 2018-04-15 RX ADMIN — Medication 12.5 MG: at 07:48

## 2018-04-15 ASSESSMENT — ACTIVITIES OF DAILY LIVING (ADL)
RETIRED_COMMUNICATION: 0-->UNDERSTANDS/COMMUNICATES WITHOUT DIFFICULTY
TOILETING: 0-->INDEPENDENT
AMBULATION: 1-->ASSISTIVE EQUIPMENT
SWALLOWING: 0-->SWALLOWS FOODS/LIQUIDS WITHOUT DIFFICULTY
DRESS: 0-->INDEPENDENT
COGNITION: 0 - NO COGNITION ISSUES REPORTED
NUMBER_OF_TIMES_PATIENT_HAS_FALLEN_WITHIN_LAST_SIX_MONTHS: 2
BATHING: 0-->INDEPENDENT
TRANSFERRING: 0-->INDEPENDENT
RETIRED_EATING: 0-->INDEPENDENT
FALL_HISTORY_WITHIN_LAST_SIX_MONTHS: YES

## 2018-04-15 NOTE — DISCHARGE INSTRUCTIONS
Going home after an Angiogram    PROCEDURE SITE:  It is normal to have soreness and small bruising at the puncture site as well as mild tingling. You may shower but please do not use a hot tub, bath tub or pool for 2 days. Do not apply any lotion or powder near the site for 3 days. For 1 week no lifting >15 pounds.    ACTIVITY:  Keep in mind everyone will recover at a different pace. This can be related to your activity level prior to the heart attack as well as how much damage your heart attack caused. Eventually the goal per the American Heart Association is 30 minutes of moderate exercise 5 times a week. In the first 2 weeks it is best for all patient to avoid strenuous/vigorous exercise including sex.    MEDICATIONS:  1. You have begun Plavix (clopidogrel), Effient (prasugrel) or Brilinta (ticagrelor). This medication is essential for your stent(s). Do not stop it without speaking first to your heart doctor or their nurse- this includes if you have concerns about side effects or cost. Also, if another health provider tells you to stop it, let them know they need to discuss it with your heart doctor.   3. If you have not been continued on other medications you were previously taking at home it is probably for reason though please discuss your concerns with any of your doctors.     DIET:  We recommend a diet low in saturated fat, trans fat and cholesterol. In addition it will be helpful to be cautious of sodium intake and carbohydrates. Try to increase the amount of lean meats you eat like fish and chicken, but avoid frying; and reduce the amount of red meat you eat. Eat more fresh fruits and vegetables and try to avoid canned and processed food. Please reference the handouts you received for more specific information.    CARDIAC REHAB:  After the initial healing process of the procedure site, we recommend cardiac rehabilitation for all heart attack and stent patients. Cardiac rehabilitation will help you:  -  Rebuild stamina, strength and balance.  - Learn how to participate in activities safely, as well as help you regain confidence to do so.  - Return to activities of daily living and leisure.  You should receive a call from them within the next week, but if you do not or you would like to call you can contact their central scheduling at [phone]    OTHER INFORMATION:  1. Consider having your family members learn CPR if they do not know it already.  2. It's not uncommon for heart attack sufferers to experience feelings of depression, anxiety or denial. Please do not be afraid to discuss these symptoms with any of your health providers at any point in your recovery.  3. If you are a smoker, quitting smoking will be one of the most important things you can do for yourself. There are nicotine replacement options or medications they might be able to be prescribed. Please discuss this with your doctors. Consider calling the QuitPlan at 5-545-624-ATJN (4249) as they can offer ongoing support after discharge.    CALL YOUR DOCTOR IF:  -You have a large or growing lump/bump around the procedure site  -The site is red, swollen, hot, tender or has drainage  -You have hives, a rash or unusual itching  -You have increasing or worsening shortness of breath or chest pain    FOLLOW UP:  We prefer you to follow up with your primary care provider within one week. You will be arranged to see the cardiologist (heart doctor) in clinic in about one month.     Should you need to contact us:  Cardiology clinic for scheduling or triage nurse questions/concerns:  [phone]
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
## 2018-04-15 NOTE — PLAN OF CARE
Discharge  Patient admitted to  from Cath lab yesterday. Stent placed to LAD 4/13 at OSH and had subsequent CP since procedure. She was told to go to closest ED and was admitted to [facility] 4/14.  Angiogram 4/14 showed clean cors.    AxOx4, VSS. Left groin site soft, dry and with bandaid. Right groin site from 4/13 angiogram is bruised with hematoma with no changes noted to previously-outlined area. Right radial site also accessed 4/13 and bandage c/d/i.  Per MDs, ordered echo not needed, pt may discharge.     Discharge instructions, medications, follow-up appointment discussed with patient and . All information understood. Paperwork signed, copy given to patient. PIV removed.  Patient taken to discharge pharmacy in  by , then will be driven home by .

## 2018-04-15 NOTE — H&P
History & Physical  Service: Cardiology [facility]     Vivien Gilbert MRN# [id number]   YOB: 1950 Age: 67 year old      Date of Admission:  4/14/2018    Chief Complaint: Chest pain s/p angioplasty 4/13/18 with 1 JAIME to LAD      History of Present Illness:    67-year-old female with PMH of CAD status post JAIME to LAD yesterday at OSH arrived today to the emergency department for evaluation of chest pain.  The patient had intermittent angina for several months, and yesterday underwent angiogram/stent placement at [facility].  Post-procedure the patient reported some worsening of chest pain / pressure at rest and with walking.  Also has some dyspnea since yesterday that she describes as difficulty getting a breath in.     Stat EKG showed: NSR, HR 60,   CXR with bilateral lower lobe opacities.   Troponin peaked at 1.38.   NTproBNP 648  Due to concern for in-stent thrombosis, cath lab was activated and showed normal coronary arteries and patent stent.    She currently feels like her chest pain and pressure and dyspnea have all improved.  She is sitting comfortably in RA.  Some poor appetite, otherwise feels well and is eager to go home.     ROS: 10 point ROS is negative except per HPI    Past Medical History and meds:    CAD: Aspirin 81 mg, ticagrelor 180 mg, atorva 20, PRN nitro  HTN: losartan 50mg  BRAULIO: Bupropion 100mg  MS: tamsulosin, Dalfampridine     PSH: revewed    Medications: reviewed    Allergies:  - reviewed    Social History:  - Former smoker, quit in 1980s.  Denies alcohol or tobacco.  Lives with .  Independent with ADLs.    Family History:  - CAD in father      Exam:  Temp:  [97.9  F (36.6  C)] 97.9  F (36.6  C)  Heart Rate:  [54-60] 58  Resp:  [15-18] 17  BP: (101-125)/(55-67) 125/57  SpO2:  [97 %-100 %] 100 %  No intake or output data in the 24 hours ending 04/14/18 1907    General: Alert, interactive, NAD   HEENT: Atraumatic, normocephalic, sclera  anicteric, EOMI, OP clear with MMM  Neck: Supple,   Resp: CTAB, no wheezes -- on RA  Cardiac: RRR  Abdomen: Soft, nontender, nondistended. Active BS.   R groin site with bruising from catheter insertion yesterday.  L groin site clean.    Extremities: No LE edema B/L, good peripheral pulses and capillary refill  Skin: Warm and dry, no jaundice or rash  Neuro and Psych: Alert & grossly oriented, CNS 3-12 grossly intact, moves all extremities equally    Labs/Imaging reviewed in the EMR.    Labs:  Troponin peaked at 1.38.   NTproBNP 648    EKG:   Stat EKG showed: NSR, HR 60    CXR with bilateral lower lobe opacities.     Cardiac stress test: 4/12/18 FINAL CONCLUSIONS  1-The stress perfusion images show a large area of moderate to severely reduced uptake involving the anterior wall and anterior  septum and mildly reduced uptake in the apex and apical lateral wall. This appears to be reversible on comparison to resting images.  This is indicative of large area of anterior ischemia in the LAD territory.  2-Positive ECG evidence of ischemia with the development of 1-2 mm of ST depression noted in leads II, III and aVF and V 5 and V 6  which resolved 0905 into recovery. Stress ECG is positive for myocardial ischemia.  3-Normal left ventricular size and systolic function with a calculated LVEF of >70%.  4-Normal left ventricular wall motion.  5-Patient had presenting chest pain symptoms 3 minutes into protocol.  6-There were no prior studies available for comparison.    Cardiac angiogram:  4/13/18: at OSH -- report not available    4/14/18 here:  CORONARY ANGIOGRAM:   1. Both coronary arteries arise from their respective cusps.  2. Dominance: Right  3. The LM is very short and is without angiographic evidence of disease.   4. LAD: Type 3 [LAD supplies the entire apex].. The LAD gives rise to septal perforators, small size D1.  There is ostial 20% stenosis of LAD. There is patent stent in the mid LAD, jailing the D1. There is  JB III flow in the LAD and its branches.    5. LCX gives rise to tiny OM1 and medium size distal OM2 and OM3 vessels.  There is 20-30% stenosis of the mid circumflex artery.  6. RCA gives rise to PL branches and supplies PDA. There is 20-30% stenosis of the proximal RCA.   LEFT HEART CATHETERIZATION:  1. LVEDP 3 mmHg.  2. No gradient across the aortic valve on pull back.  SUMMARY:   -Ostial 20% stenosis of LAD  Patent mid LAD stent with JB III flow   -20 to 30% stenosis of the mid circumflex artery  -20 to 30% stenosis of the proximal RCA.      Assessment/ Plan:  67-year-old female with PMH of CAD status post JAIME to LAD 4/13/18 at OSH arrived today to the emergency department for evaluation of chest pain/pressure/dyspnea.  Due to concern for in-stent thrombosis, cath lab was activated and showed normal coronary arteries and patent stent.    # Chest pain/pressure, dyspnea, improved  - Not 2/2 CAD given normal angiogram with patent stent.    Dyspnea may be 2/2 ticagrelor.  Given some lower lobe opacities, may have a pulmonary process going on but will hold off on any antibiotic at this point.    - TTE ordered  - Continue Aspirin 81 mg,   - Switch from ticagrelor 90 BID to clopidogrel.  Will give 600mg loading dose here and then 75 qday from tomorrow in case some of the dyspnea was driven by ticagrelor.  - Continue atorva 20 -- need to uptitrate to 80 on the outpatient setting.   - Start metop succ 12.5  but may have to hold given HR in 50s-60s.  - Continue losartan 50mg  - Cardiac rehab outpatient basis    --- CHRONIC ISSUES ---  # BRAULIO: Bupropion 100mg  # MS: tamsulosin, Dalfampridine    FEN: cardiac diet  Prophy: ambulate  Code Status: full code  Disposition Plan   Expected discharge: Tomorrow, recommended to prior living arrangement.     To be staffed in the AM.      Crow Balderas DO, MS  PGY-2, Internal Medicine Resident  Pager     I have seen and examined the patient and agree with the finding and  plan.   Seen on the 15th with the team.     Jake Hutchison MD  Cardiology-[facility]  [phone]

## 2018-04-15 NOTE — PROCEDURES
CARDIAC CATH REPORT:     PROCEDURES PERFORMED:   Retrograde left femoral artery access  Coronary Angiography  Left Heart Catheterization    PHYSICIANS:  Attending Physician: Jake Hutchison MD  Interventional Cardiology Fellow: Carlos Lechuga MD    INDICATION:  Vivien Gilbert is a 67-year-old female with PMH of CAD status post stent to LAD yesterday at OSH arriving today to the emergency department for evaluation of chest pain.  The patient states she has had intermittent angina for several months, and yesterday underwent stent at [facility].  Post-procedure the patient reported some worsening of chest pain / pressure up to a maximum of 7 / 10.  She reports no new trauma.  She reports no fever, chills, cough.  She reports pain is somewhat different than pain preceding stent. She also has shortness of breath which is new since yesterday. She presents on an urgent basis. The clinical presentation was Worsening (Accelerated) Angina.    DESCRIPTION:  1. Consent obtained with discussion of risks.  All questions were answered.  2. Sterile prep and procedure.  3. Location with Sheaths:   Lf Femoral Arterial  4 Fr 25 cm [long]  4. Access: Local anesthetic with lidocaine.  A standard 18 guage needle with ultrasound guidance was used to establish vascular access using a modified Seldinger technique.  5. Diagnostic Catheters:   4 Fr  3DRC and JL 4  6. Guiding Catheters:  None  6. Estimated blood loss: < 25 ml    MEDICATIONS:  The procedure was performed under conscious sedation for 11 minutes from 8:47 to 7:58.  The patient was assessed immediately before the first sedation medication was administered.  Midazolam 2 mg and Fentanyl 50 mcg were administered.  Heart rate, BP, respiration, oxygen saturation and patient responses were monitored throughout the procedure with the assistance of the RN under my supervision.    Procedures:    CORONARY ANGIOGRAM:   1. Both coronary arteries arise from their respective  cusps.  2. Dominance: Right  3. The LM is very short and is without angiographic evidence of disease.   4. LAD: Type 3 [LAD supplies the entire apex].. The LAD gives rise to septal perforators, small size D1.  There is ostial 20% stenosis of LAD. There is patent stent in the mid LAD, jailing the D1. There is JB III flow in the LAD and its branches.    5. LCX gives rise to tiny OM1 and medium size distal OM2 and OM3 vessels.  There is 20-30% stenosis of the mid circumflex artery.  6. RCA gives rise to PL branches and supplies PDA. There is 20-30% stenosis of the proximal RCA.    LEFT HEART CATHETERIZATION:  1. LVEDP 3 mmHg.  2. No gradient across the aortic valve on pull back.    Sheath Removal:  The left femoral artery  sheath was manually removed in the cardiac catheterization laboratory.    Contrast: Isovue, 20 ml     Fluoroscopy Time: 1.5 min    COMPLICATIONS:  1. None    SUMMARY:   -Ostial 20% stenosis of LAD  Patent mid LAD stent with JB III flow   -20 to 30% stenosis of the mid circumflex artery  -20 to 30% stenosis of the proximal RCA.    PLAN:   >> ASA 81 mg qd and Ticagrelor 90 mg BID  >> Bedrest per protocol.  >> Continued medical management and lifestyle modification for cardiovascular risk factor optimization.   >>. Admit for observation    The attending interventional cardiologist was present and supervised all critical aspects the procedure.    See CVIS report for final draft.    Carlos Lechuga MD  Cardiology Fellow    Jake Hutchison MD  Cardiology Staff        This is a preliminary report. Please see CVIS for the final report.   I was present for the entire procedure.     Jake Hutchison MD.  Interventional Cardiology

## 2018-04-15 NOTE — PLAN OF CARE
Problem: Patient Care Overview  Goal: Plan of Care/Patient Progress Review    Admission  D: Patient admitted to 6C from Cath lab. Patient had stent placed to LAD 4/13 at OSH and had subsequent CP since procedure. She was told to go to [facility] ED and was admitted to [facility] 4/14.  Angiogram today showed clean cors.  I: Settled and admitted patient to . Gave scheduled meds.  A: AxOx4, VSS. Left groin accessed today, sheath pulled in CCL. Area is soft, dry and with bandaid. Right groin site from 4/13 angiogram is bruised with hematoma that is outlined. Right radial site also accessed 4/13 and bandage c/d/i.   P: TTE ordered for 4/15. Possible discharge to home afterwards.

## 2018-04-16 ENCOUNTER — CARE COORDINATION (OUTPATIENT)
Dept: CARE COORDINATION | Facility: CLINIC | Age: 68
End: 2018-04-16

## 2018-04-16 LAB — INTERPRETATION ECG - MUSE: NORMAL

## 2018-05-03 ENCOUNTER — HOSPITAL ENCOUNTER (OUTPATIENT)
Dept: CARDIAC REHAB | Facility: CLINIC | Age: 68
End: 2018-05-03
Attending: INTERNAL MEDICINE
Payer: COMMERCIAL

## 2018-05-03 VITALS — WEIGHT: 163 LBS | HEIGHT: 64 IN | BODY MASS INDEX: 27.83 KG/M2

## 2018-05-03 PROCEDURE — 93797 PHYS/QHP OP CAR RHAB WO ECG: CPT | Mod: 59 | Performed by: REHABILITATION PRACTITIONER

## 2018-05-03 PROCEDURE — 93798 PHYS/QHP OP CAR RHAB W/ECG: CPT | Performed by: REHABILITATION PRACTITIONER

## 2018-05-03 PROCEDURE — 40000575 ZZH STATISTIC OP CARDIAC VISIT #2: Performed by: REHABILITATION PRACTITIONER

## 2018-05-03 PROCEDURE — 40000116 ZZH STATISTIC OP CR VISIT: Performed by: REHABILITATION PRACTITIONER

## 2018-05-03 ASSESSMENT — 6 MINUTE WALK TEST (6MWT)
TOTAL DISTANCE WALKED (FT): 924
MALE CALC: 1476.8
FEMALE CALC: 1496.25
GENDER SELECTION: FEMALE
PREDICTED: 1485.81

## 2018-05-03 NOTE — PROGRESS NOTES
Physician cosignature/electronic signature indicates approval of this ITP document. I have established, reviewed and made necessary changes to the individualized treatment plan and exercise prescription for this patient.   05/03/18 0900   Session   Session Initial Evaluation and Exercise Prescription   Certified through this date 06/01/18   Cardiac Rehab Assessment   Cardiac Rehab Assessment Stent placed at [place] after positive stress test following a month of chest pain. Re-admitted to [facility] with recurrent CP and dyspnea, cath showed no further diseaseand patent set in mid LAD. A medication change was made, swithing her ticagrelor to clopidogrel due to risk of dysnea. Pt has a long history of Multiple Sclerosis, with main symtoms being leg weakness, poor coordination, foot drop and neuropathy. She uses a cane for short distances but needs rolator walker for longer distances. She required a switch from cane to rolator walker during 6MWT.   The patient's history and clinical status including hemodynamics and ECG were evaluated. The patient was assessed to be stable and appropriate to begin exercise.   The patient's functional capacity and exercise prescription were determined by the completion of the 6 minute walk test. See results above. The patient was oriented to the program.  Risk factor profile was completed. Goals and objectives were discussed. CV response was WNL. No symptoms, complaints or pain were reported. Good prognosis for reaching goals below. Skilled therapy is necessary in order to monitor CV response to exercise, adapting for falls risk, to provide education on risk factors and behavior change counseling needed to achieve patient's goals.  Plan to progress to 30-40 minutes of exercise prior to discharge from cardiac rehab.  Initial THR of 20-30 beats above RHR; Effort rating of 4-6. Initiate muscle conditioning as appropriate. Provide risk factor education and behavior change counseling.    General  Information   Treatment Diagnosis Stent   Date of Treatment Diagnosis 04/13/18   Secondary Treatment Diagnosis Stable Angina   Significant Past CV History Clinical significant depression or depressive symptoms   Comorbidities Malignancy  (skin)   Other Medical History Pt has Multiple Sclerosis dx 1986, using cane 6 years, secondary progressive. Balance and gaite problems, neuropathy in feet, decreased fine motor coordination.   Lead up symptoms Chest pains for about 1 month, midsternal pressure, incr. exertion   Hospital Location [facility]   Hospital Discharge Date 04/15/18   Signs and Symptoms Post Hospital Discharge Palpitations;Fatigue;Sleep;Anxiety   Outpatient Cardiac Rehab Start Date 05/03/18   Primary Physician Dr. Matheus Tomas   Primary Physician Follow Up Completed   Cardiologist Dr. Justin Echevarria   Cardiologist Follow Up Scheduled   Risk Stratification High   Summary of Cath Report   Summary of Cath Report Available   Date Performed 04/14/18   Left Main short without disease   LAD ostial 20% stenosis of LAD, patent sten in mid LAD   D1 jailing of D1   LCX 20-30% stenosis of proximal RCA   Living and Work Status    Living Arrangements and Social Status spouse;house   Return to Employment Yes  (part time)   Occupation  at [employer], free writting (content for atttorney)   Preventative Medications   CMS recommended medications Antiplatelets;Lipid Lowering   Fall Risk Screen   Fall screen completed by Cardiac Rehab   Have you fallen 2 or more times in the past year? Yes   Have you fallen and had an injury in the past year? No   Is patient a fall risk? Yes;Department fall risk interventions implemented   Fall screen comments Pt has P.T. for balance about 1 year ago   Pain   Patient Currently in Pain No   Physical Assessments   Incisions Not applicable   Edema +1 Trace   Right Lung Sounds not assessed   Left Lung Sounds not assessed   Limitations Other (see comments)   Comments balance,  "gaite, neuropaty, foot drop   Individualized Treatment Plan   Monitored Sessions Scheduled 24   Monitored Sessions Attended 1   Oxygen   Supplemental Oxygen needed No   Nutrition Management - Weight Management   Assessment Initial Assessment   Age 67   Weight 73.9 kg (163 lb)   Height 1.613 m (5' 3.5\")   BMI (Calculated) 28.48   Initial Rate Your Plate Score. Dietary tool to assess eating patterns. Scores range from 24 to 72. The higher the score the healthier the eating pattern. (to complete)   Nutrition Management - Lipids   Lipids Labs Not Available   Nutrition Management - Diabetes   Diabetes No   Nutrition Management Summary   Dietary Recommendations Low Cholesterol  (vegitarian)   Stages of Change for Diet Compliance Preparation   Interventions Planned Attend Nutrition Education Class(es);Complete Food Diary;Instruct on Label Reading;Educate on Weight Management Principles   Patient Goals Goal #1   Goal #1 Description Pt would like to loose wt. (.5-2# per week) and improve heart healthy eating habits, by initiating ex for wt loss benefit and participating in educational classes   Goal #1 Target Date 06/01/18   Psychosocial Management   Psychosocial Assessment Initial   Is there history of clinical depression or increased risk of depression? History of clinical depression   Current Level of Stress per Patient Report Moderate    Current Coping Skills Uses Stress Management/Relaxation Techniques;Has Positive Support System;Is on Medication for Depression/Anxiety  (listening to music, recognize it)   Initial Patient Health Questionnaire -9 Score (PHQ-9) for depression. 5-9 Minimal symptoms, 10-14 Minor depression, 15-19 Major depression, moderately severe, > 20 Major depression, severe  6   Initial [facility] Survey score.  Quality of Life:   If total score > 25 review individual areas where patient rated a 4 or 5.  Consider patients current medical condition and what role that plays on the score.   Adjust " treatment protocol to improve areas of concern.  Consider the following:  PHQ9 score, DASI, and re-assessment within the next 30 days to assist with developing treatments.  26   Stages of Change Action   Interventions Planned Patient to verbalize understanding of behavioral assessment results;Patient to verbalize understanding of negative impact of stress to personal health;Patient will recognize signs and symptoms of depression;Patient interested in implementing one strategy to reduce current level of stress/anxiety;Patient to attend stress management class(es)   Patient Goal Yes   Goal Description Pt would like to learn more about managing stress, by attending educational classes   Goal Target Date 06/01/18   Other Core Components - Hypertension   History of or Diagnosis of Hypertension Yes   Currently taking Anti-Hypertensives CCB   Other Core Components - Tobacco   History of Tobacco Use Yes   Tobacco Use Status Former (Quit > 6 mo ago)   Tobacco Habit Cigarettes   Tobacco Use per Day (average) .5   Years of Tobacco Use 6years   Stages of Change Maintenance   Other Core Components Summary   Interventions Planned Attend education class on Blood Pressure;Provide information on home blood pressure monitoring   Activity/Exercise History   Activity/Exercise Assessment Initial   Activity/Exercise Status prior to event? Sedentary  (rides sta bike 30 min 3x week)   Number of Days Currently participating in Moderate Physical Activity? 0   Number of Days Currently performing  Aerobic Exercise (including rehab)? 0   Number of Minutes per Session Currently of Aerobic Exercise (average)? 0   Current Stage of Change (Physical Activity) Preparation   Current Stage of Change (Aerobic Exercise) Preparation   Patient Goals Goal #1;Goal #2;Goal #3   Goal #1 Description Pt would like to improve fitness to tolerate 30 min of moderate intensity exertion (>4METs) with stable cv response   Goal #1 Target Date 06/01/18   Goal #2  Description Improve L/E, and core strength by establishing str. training program   Goal #2 Target Date 06/01/18   Goal #3 Description Pt would like to establish indep exercise to transition into 2-3x week indep prior to d/c   Goal #3 Target Date 06/01/18   Exercise Assessment   6 Minute Walk Predicted - Gender Selection Female   6 Minute Walk Predicted (Male) 1476.8   6 Minute Walk Predicted (Female) 1496.25   Initial 6 Minute Walk Distance (Feet) 924 ft   Resting HR 58 bpm   Exercise  bpm   Post Exercise HR 64 bpm   Resting /58   Exercise /56   Post Exercise /60   Effort Rating 5   Current MET Level 2.3   MET Level Goal 4   ECG Rhythm Sinus bradycardia   Ectopy None   Current Symptoms Numbness/tingling;Weakness;Other (comments)   Limitations/Restrictions Other (see comments)   Exercise Prescription   Mode Treadmill;Nustep;Weights   Duration/Time Intermittent bouts   Frequency 3 daysweek   THR (85% of age predicted max HR) 130.9   OMNI Effort Rating (0-10 Scale) 4-6/10   Progression Intermittent bouts;Aerobic exercise to OMNI rating of 5-7, and heart rate at or below target;Progress peak intensity by 1/4 MET per week   Recommended Home Exercise   Type of Exercise Bike   Frequency (days per week) on off days from rehab   Duration (minutes per session) Intermittent   Effort Rating Recommended 4-6/10   30 Day Exercise Plan increase indep ex as per tolerance and progression in CR   Current Home Exercise   Type of Exercise None   Follow-up/On-going Support   Provider follow-up needed on the following No follow-up needed   Learning Assessment   Learner Patient   Primary Language English   Preferred Learning Style Listening;Reading;Demonstration;Pictures/Video   Barriers to Learning No barriers noted   Patient Education   Education recommended Anatomy and Physiology of the Heart;Blood Pressure;Exercise Principles;Medication Overview;Muscle Conditioning;Nutrition;Risk Factors;Stress Management;Weight  Loss

## 2018-05-04 ENCOUNTER — HOSPITAL ENCOUNTER (OUTPATIENT)
Dept: CARDIAC REHAB | Facility: CLINIC | Age: 68
End: 2018-05-04
Attending: INTERNAL MEDICINE
Payer: COMMERCIAL

## 2018-05-04 VITALS — WEIGHT: 163.5 LBS | BODY MASS INDEX: 28.51 KG/M2

## 2018-05-04 PROCEDURE — 40000116 ZZH STATISTIC OP CR VISIT

## 2018-05-04 PROCEDURE — 93798 PHYS/QHP OP CAR RHAB W/ECG: CPT

## 2018-05-04 ASSESSMENT — 6 MINUTE WALK TEST (6MWT)
GENDER SELECTION: FEMALE
TOTAL DISTANCE WALKED (FT): 924

## 2018-05-08 ENCOUNTER — HOSPITAL ENCOUNTER (OUTPATIENT)
Dept: CARDIAC REHAB | Facility: CLINIC | Age: 68
End: 2018-05-08
Attending: INTERNAL MEDICINE
Payer: COMMERCIAL

## 2018-05-08 PROCEDURE — 93798 PHYS/QHP OP CAR RHAB W/ECG: CPT

## 2018-05-08 PROCEDURE — 40000116 ZZH STATISTIC OP CR VISIT

## 2018-05-09 ENCOUNTER — HOSPITAL ENCOUNTER (OUTPATIENT)
Dept: CARDIAC REHAB | Facility: CLINIC | Age: 68
End: 2018-05-09
Attending: INTERNAL MEDICINE
Payer: COMMERCIAL

## 2018-05-09 PROCEDURE — 93798 PHYS/QHP OP CAR RHAB W/ECG: CPT

## 2018-05-09 PROCEDURE — 40000116 ZZH STATISTIC OP CR VISIT

## 2018-05-11 ENCOUNTER — HOSPITAL ENCOUNTER (OUTPATIENT)
Dept: CARDIAC REHAB | Facility: CLINIC | Age: 68
End: 2018-05-11
Attending: INTERNAL MEDICINE
Payer: COMMERCIAL

## 2018-05-11 PROCEDURE — 40000116 ZZH STATISTIC OP CR VISIT

## 2018-05-11 PROCEDURE — 93798 PHYS/QHP OP CAR RHAB W/ECG: CPT

## 2018-05-15 ENCOUNTER — HOSPITAL ENCOUNTER (OUTPATIENT)
Dept: CARDIAC REHAB | Facility: CLINIC | Age: 68
End: 2018-05-15
Attending: INTERNAL MEDICINE
Payer: COMMERCIAL

## 2018-05-15 PROCEDURE — 40000116 ZZH STATISTIC OP CR VISIT: Performed by: REHABILITATION PRACTITIONER

## 2018-05-15 PROCEDURE — 93798 PHYS/QHP OP CAR RHAB W/ECG: CPT | Performed by: REHABILITATION PRACTITIONER

## 2018-05-17 ENCOUNTER — HOSPITAL ENCOUNTER (OUTPATIENT)
Dept: CARDIAC REHAB | Facility: CLINIC | Age: 68
End: 2018-05-17
Attending: INTERNAL MEDICINE
Payer: COMMERCIAL

## 2018-05-17 PROCEDURE — 40000116 ZZH STATISTIC OP CR VISIT

## 2018-05-17 PROCEDURE — 93798 PHYS/QHP OP CAR RHAB W/ECG: CPT

## 2018-05-18 ENCOUNTER — HOSPITAL ENCOUNTER (OUTPATIENT)
Dept: CARDIAC REHAB | Facility: CLINIC | Age: 68
End: 2018-05-18
Attending: INTERNAL MEDICINE
Payer: COMMERCIAL

## 2018-05-18 PROCEDURE — 40000116 ZZH STATISTIC OP CR VISIT: Performed by: REHABILITATION PRACTITIONER

## 2018-05-18 PROCEDURE — 93798 PHYS/QHP OP CAR RHAB W/ECG: CPT | Performed by: REHABILITATION PRACTITIONER

## 2018-05-21 ENCOUNTER — HOSPITAL ENCOUNTER (OUTPATIENT)
Dept: CARDIAC REHAB | Facility: CLINIC | Age: 68
End: 2018-05-21
Attending: INTERNAL MEDICINE
Payer: COMMERCIAL

## 2018-05-21 PROCEDURE — 93798 PHYS/QHP OP CAR RHAB W/ECG: CPT | Performed by: OCCUPATIONAL THERAPIST

## 2018-05-21 PROCEDURE — 40000116 ZZH STATISTIC OP CR VISIT: Performed by: OCCUPATIONAL THERAPIST

## 2018-05-22 ENCOUNTER — HOSPITAL ENCOUNTER (OUTPATIENT)
Dept: CARDIAC REHAB | Facility: CLINIC | Age: 68
End: 2018-05-22
Attending: INTERNAL MEDICINE
Payer: COMMERCIAL

## 2018-05-22 VITALS — HEIGHT: 64 IN | BODY MASS INDEX: 27.49 KG/M2 | WEIGHT: 161 LBS

## 2018-05-22 PROCEDURE — 93797 PHYS/QHP OP CAR RHAB WO ECG: CPT | Performed by: REHABILITATION PRACTITIONER

## 2018-05-22 PROCEDURE — 40000575 ZZH STATISTIC OP CARDIAC VISIT #2: Performed by: REHABILITATION PRACTITIONER

## 2018-05-22 PROCEDURE — 40000116 ZZH STATISTIC OP CR VISIT: Performed by: REHABILITATION PRACTITIONER

## 2018-05-22 PROCEDURE — 93798 PHYS/QHP OP CAR RHAB W/ECG: CPT | Performed by: REHABILITATION PRACTITIONER

## 2018-05-22 ASSESSMENT — 6 MINUTE WALK TEST (6MWT)
GENDER SELECTION: FEMALE
TOTAL DISTANCE WALKED (FT): 924
MALE CALC: 1482.3
TOTAL DISTANCE WALKED (FT): 1354
FEMALE CALC: 1503.15
PREDICTED: 1491.34

## 2018-05-22 NOTE — PROGRESS NOTES
Physician cosignature/electronic signature indicates approval of this ITP document. I have established, reviewed and made necessary changes to the individualized treatment plan and exercise prescription for this patient.   05/22/18 1500   Session   Session Discharge Note   Certified through this date 06/01/18   Cardiac Rehab Assessment   Cardiac Rehab Assessment Pt is discharging early due to plan to leave town for the rest of the summer. She did not meet goals, she did see signif. improvement in confidence level and has started making good liefetyle changes. Exercise intenstiy is somewhat limitted by M.S. Symptoms.  Pt made significant gains in exercise tolerance. Initially patient tolerated 15 minutes at 2.2METs, now tolerating 30 minutes at 2.6 METs.(fitness level may not be accurate due incr effort with gaite due to M.S. That is not reflected in MET estimates) Patient also increased 6-minute walk test by 46% (an increase of 436 feet.) The PT was given instructions on frequency, intensity, and duration for continued exercise as well as muscle conditioning and stretching exercises.  Your PT plans to continue using her sta. Bike, she is considering getting recumbent stepper for home ex. as it is less stress on her knees. Pt also discussed returning for WEL program after her summer away.    General Information   Treatment Diagnosis Stent   Date of Treatment Diagnosis 04/13/18   Secondary Treatment Diagnosis Stable Angina   Significant Past CV History Clinical significant depression or depressive symptoms   Comorbidities Malignancy  (skin)   Other Medical History Pt has Multiple Sclerosis dx 1986, using cane 6 years, secondary progressive. Balance and gaite problems, neuropathy in feet, decreased fine motor coordination.   Lead up symptoms Chest pains for about 1 month, midsternal pressure, incr. exertion   Hospital Location [facility]   Hospital Discharge Date 04/15/18   Signs and Symptoms Post Hospital  "Discharge Palpitations;Fatigue;Sleep;Anxiety   Outpatient Cardiac Rehab Start Date 05/03/18   Primary Physician Dr. Matheus Tomas   Primary Physician Follow Up Completed   Cardiologist Dr. Justin Echevarria   Cardiologist Follow Up Scheduled   Risk Stratification High   Summary of Cath Report   Summary of Cath Report Available   Date Performed 04/14/18   Left Main short without disease   LAD ostial 20% stenosis of LAD, patent sten in mid LAD   D1 jailing of D1   LCX 20-30% stenosis of proximal RCA   Living and Work Status    Living Arrangements and Social Status spouse;house   Return to Employment Yes  (part time)   Occupation  at [employer], free writting (content for atttorney)   Preventative Medications   CMS recommended medications Antiplatelets;Lipid Lowering   Fall Risk Screen   Fall screen completed by Cardiac Rehab   Have you fallen 2 or more times in the past year? Yes   Have you fallen and had an injury in the past year? No   Timed Up and Go score (seconds) 10.6 seconds 9.6 seconds   Is patient a fall risk? Yes;Department fall risk interventions implemented   Fall screen comments Pt has P.T. for balance about 1 year ago   Pain   Patient Currently in Pain No   Physical Assessments   Incisions Not applicable   Edema +1 Trace   Right Lung Sounds not assessed   Left Lung Sounds not assessed   Limitations Other (see comments)   Comments balance, gaite, neuropaty, foot drop   Individualized Treatment Plan   Monitored Sessions Scheduled 24   Monitored Sessions Attended 10   Oxygen   Supplemental Oxygen needed No   Nutrition Management - Weight Management   Assessment Discharge   Age 67   Weight 73 kg (161 lb)   Height 1.613 m (5' 3.5\")   BMI (Calculated) 28.13   Initial Rate Your Plate Score. Dietary tool to assess eating patterns. Scores range from 24 to 72. The higher the score the healthier the eating pattern. 56   Discharge Rate Your Plate Score 64   Nutrition Management - Lipids   Lipids Labs Not Available "   Nutrition Management - Diabetes   Diabetes No   Nutrition Management Summary   Dietary Recommendations Low Cholesterol  (vegitarian)   Stages of Change for Diet Compliance Preparation   Interventions Planned Attend Nutrition Education Class(es);Complete Food Diary;Instruct on Label Reading;Educate on Weight Management Principles   Patient Goals Goal #1   Goal #1 Description Pt would like to loose wt. (.5-2# per week) and improve heart healthy eating habits, by initiating ex for wt loss benefit and participating in educational classes   Goal #1 Target Date 06/01/18   Goal #1 Progress Towards Goal 5/22 Pt has not lost wt, she has not incr. phys activity level enough to effect calorie burning though she is making good diet changes.    Psychosocial Management   Psychosocial Assessment Re-assessment   Is there history of clinical depression or increased risk of depression? History of clinical depression   Current Level of Stress per Patient Report Moderate    Current Coping Skills Uses Stress Management/Relaxation Techniques;Has Positive Support System;Is on Medication for Depression/Anxiety  (listening to music, recognize it)   Initial Patient Health Questionnaire -9 Score (PHQ-9) for depression. 5-9 Minimal symptoms, 10-14 Minor depression, 15-19 Major depression, moderately severe, > 20 Major depression, severe  6   Discharge PHQ-9 Score for Depression 6   Initial [employer] [facility] Survey score.  Quality of Life:   If total score > 25 review individual areas where patient rated a 4 or 5.  Consider patients current medical condition and what role that plays on the score.   Adjust treatment protocol to improve areas of concern.  Consider the following:  PHQ9 score, DASI, and re-assessment within the next 30 days to assist with developing treatments.  26   Discharge [employer] [facility] Survey Score 20.5   Stages of Change Action   Interventions Planned Patient to verbalize understanding of behavioral assessment results;Patient  to verbalize understanding of negative impact of stress to personal health;Patient will recognize signs and symptoms of depression;Patient interested in implementing one strategy to reduce current level of stress/anxiety;Patient to attend stress management class(es)   Patient Goal Yes   Goal Description Pt would like to learn more about managing stress, by attending educational classes   Goal Target Date 06/01/18   Progress Towards Goal 5/22 Pt has not attended ed. classes, she reports signif. stress levels in her family. She does intend to find resources on line for managing stress.    Other Core Components - Hypertension   History of or Diagnosis of Hypertension Yes   Currently taking Anti-Hypertensives CCB   Other Core Components - Tobacco   History of Tobacco Use Yes   Tobacco Use Status Former (Quit > 6 mo ago)   Tobacco Habit Cigarettes   Tobacco Use per Day (average) .5   Years of Tobacco Use 6years   Stages of Change Maintenance   Other Core Components Summary   Interventions Planned Attend education class on Blood Pressure;Provide information on home blood pressure monitoring   Activity/Exercise History   Activity/Exercise Assessment Discharge   Activity/Exercise Status prior to event? Sedentary  (rides sta bike 30 min 3x week)   Number of Days Currently participating in Moderate Physical Activity? 0   Number of Days Currently performing  Aerobic Exercise (including rehab)? 5   Number of Minutes per Session Currently of Aerobic Exercise (average)? 0   Current Stage of Change (Physical Activity) Preparation   Current Stage of Change (Aerobic Exercise) Preparation   Patient Goals Goal #1;Goal #2;Goal #3   Goal #1 Description Pt would like to improve fitness to tolerate 30 min of moderate intensity exertion (>4METs) with stable cv response   Goal #1 Target Date 06/01/18   Goal #1 Progress Towards Goal 5/22 Pt has made signif. gains in ex. duration, intensity MET levels not accurate due to M.S. symptoms   Goal  #2 Description Improve L/E, and core strength by establishing str. training program   Goal #2 Target Date 06/01/18   Goal #2 Date Met 05/22/18   Goal #2 Progress Towards Goal 5/22 Pt has started str. exercises and plans to continue on her own.    Goal #3 Description Pt would like to establish indep exercise to transition into 2-3x week indep prior to d/c   Goal #3 Target Date 06/01/18   Goal #3 Date Met 05/22/18   Goal #3 Progress Towards Goal 5/22 Pt does use her sta bike several days per week for 15 min. she plans to incr to 20+ min.    Exercise Assessment   6 Minute Walk Predicted - Gender Selection Female   6 Minute Walk Predicted (Male) 1482.3   6 Minute Walk Predicted (Female) 1503.15   Initial 6 Minute Walk Distance (Feet) 924 ft   Discharge 6 Minute Walk Distance (Feet) 1354   Resting HR 64 bpm   Exercise  bpm   Post Exercise HR 69 bpm   Resting /60   Exercise /62   Post Exercise /56   Effort Rating 5   Current MET Level 2.6   MET Level Goal 4   ECG Rhythm Sinus bradycardia   Ectopy None   Current Symptoms Numbness/tingling;Weakness;Other (comments)   Limitations/Restrictions Other (see comments)   Exercise Prescription   Mode Treadmill;Nustep;Weights   Duration/Time Intermittent bouts   Frequency 3 daysweek   THR (85% of age predicted max HR) 130.9   OMNI Effort Rating (0-10 Scale) 4-6/10   Progression Intermittent bouts;Aerobic exercise to OMNI rating of 5-7, and heart rate at or below target;Progress peak intensity by 1/4 MET per week   Recommended Home Exercise   Type of Exercise Bike   Frequency (days per week) on off days from rehab   Duration (minutes per session) Intermittent   Effort Rating Recommended 4-6/10   30 Day Exercise Plan increase indep ex as per tolerance and progression in CR   Current Home Exercise   Type of Exercise None   Follow-up/On-going Support   Provider follow-up needed on the following No follow-up needed   Learning Assessment   Learner Patient   Primary  Language English   Preferred Learning Style Listening;Reading;Demonstration;Pictures/Video   Barriers to Learning No barriers noted   Patient Education   Education recommended Anatomy and Physiology of the Heart;Blood Pressure;Exercise Principles;Medication Overview;Muscle Conditioning;Nutrition;Risk Factors;Stress Management;Weight Loss   Education classes attended Anatomy and Physiology of the Heart;Exercise Principles   Education Comments Pt did not attend ed. due to her work schedule, reviewed some of the ed. 1:1

## 2018-10-15 ENCOUNTER — TRANSFERRED RECORDS (OUTPATIENT)
Dept: HEALTH INFORMATION MANAGEMENT | Facility: CLINIC | Age: 68
End: 2018-10-15

## 2019-12-29 ENCOUNTER — TRANSFERRED RECORDS (OUTPATIENT)
Dept: HEALTH INFORMATION MANAGEMENT | Facility: CLINIC | Age: 69
End: 2019-12-29

## 2021-05-17 ENCOUNTER — TRANSFERRED RECORDS (OUTPATIENT)
Dept: HEALTH INFORMATION MANAGEMENT | Facility: CLINIC | Age: 71
End: 2021-05-17

## 2021-10-05 NOTE — TELEPHONE ENCOUNTER
RECORDS RECEIVED FROM: Self   REASON FOR VISIT: MS   Date of Appt: 11/23/21   NOTES (FOR ALL VISITS) STATUS DETAILS   OFFICE NOTE from referring provider N/A    OFFICE NOTE from other specialist Received Dr Jessica Olivia @ [facility]:  5/17/21  11/2/20  5/4/20  12/30/19  10/2/19  (additional encounters)   DISCHARGE SUMMARY from hospital N/A    DISCHARGE REPORT from the ER N/A    OPERATIVE REPORT N/A    MEDICATION LIST Care Everywhere    IMAGING  (FOR ALL VISITS)     EMG N/A    EEG N/A    LUMBAR PUNCTURE N/A    AV SCAN N/A    ULTRASOUND (CAROTID BILAT) *VASCULAR* N/A    MRI (HEAD, NECK, SPINE) Received [facility]:  MRI Thoracic Spine 12/29/19  MRI Brain 12/29/19  MRI Cervical Spine 12/29/19  MRI Thoracic Spine 2/7/18  MRI Brain 2/7/18  MRI Cervical Spine 2/7/18   CT (HEAD, NECK, SPINE) N/A       Action 10/5/21 MV 9.24am   Action Taken Records and imaging request faxed to [facility]    --10/22/21 MV 2.47pm--  2nd request faxed to [facility]    --11/3/21 MV 10.11am--  Recs scanned in chart. Images resolved in PACS

## 2021-11-23 ENCOUNTER — PRE VISIT (OUTPATIENT)
Dept: NEUROLOGY | Facility: CLINIC | Age: 71
End: 2021-11-23

## 2021-11-23 ENCOUNTER — OFFICE VISIT (OUTPATIENT)
Dept: NEUROLOGY | Facility: CLINIC | Age: 71
End: 2021-11-23
Attending: PSYCHIATRY & NEUROLOGY
Payer: MEDICARE

## 2021-11-23 VITALS
WEIGHT: 144.7 LBS | BODY MASS INDEX: 25.64 KG/M2 | SYSTOLIC BLOOD PRESSURE: 138 MMHG | HEART RATE: 61 BPM | DIASTOLIC BLOOD PRESSURE: 69 MMHG | HEIGHT: 63 IN

## 2021-11-23 DIAGNOSIS — G35 MS (MULTIPLE SCLEROSIS) (H): Primary | ICD-10-CM

## 2021-11-23 DIAGNOSIS — N31.9 NEUROGENIC BLADDER: ICD-10-CM

## 2021-11-23 PROCEDURE — G0463 HOSPITAL OUTPT CLINIC VISIT: HCPCS

## 2021-11-23 PROCEDURE — 99205 OFFICE O/P NEW HI 60 MIN: CPT | Performed by: PSYCHIATRY & NEUROLOGY

## 2021-11-23 RX ORDER — IBUPROFEN 200 MG
CAPSULE ORAL
COMMUNITY
Start: 2021-01-07

## 2021-11-23 RX ORDER — SIMVASTATIN 40 MG
TABLET ORAL
COMMUNITY
Start: 2021-03-29

## 2021-11-23 RX ORDER — BACLOFEN 10 MG/1
10 TABLET ORAL
COMMUNITY
Start: 2019-12-30

## 2021-11-23 ASSESSMENT — MIFFLIN-ST. JEOR: SCORE: 1145.48

## 2021-11-23 ASSESSMENT — PAIN SCALES - GENERAL: PAINLEVEL: NO PAIN (0)

## 2021-11-23 NOTE — LETTER
11/23/2021      RE: Vivien Gilbert  [address]  [facility] [address]       Service Date: 11/23/2021    REASON FOR VISIT:  Vivien Gilbert is a 71-year-old woman seen on a self referred basis for neurologic consultation regarding multiple sclerosis.    HISTORY OF PRESENT ILLNESS:  Vivien tells me she initially developed symptoms of MS (Lhermitte's) in her mid 30s, over 30 years ago.  She was followed for MS for many years by Dr. Leger and more recently by Dr. Olivia at [facility].  She says she is seeking a new MS neurologist for geographic convenience mainly.  When I ask about her history of MS, she does not really describe much in terms of attacks/relapses, except in the context of illness when her walking would be impaired until she recovered from the infection.  Her  reminds her of an attack in 1989 or 1990 when she had trouble walking and also speaking and was given steroids.  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  She has been off immunotherapy in recent years because of the conclusion that she was past the relapsing stage of the disease, and that is almost certainly correct.  She was previously treated with Avonex, double-dose Avonex, and Rebif.  In terms of residual symptoms, she has gait impairment, which she attributes primarily to poor balance.  She says she cannot walk without support and generally uses her walker at all times.  She has numbness in her hands and feet and clumsiness in her hands, such that she finds it hard to play the piano.  She has neurogenic bladder with both over- and under- active components.  She just started doing intermittent catheterization at the advice of her urologist and she is not liking that very much, but apparently just started in the past few weeks.  She is on prophylactic antibiotics.  She has rare spasms in the legs, which upon further questioning is more of a twitching than a true  cramp like muscle spasm.  Those are improved with over-the-counter CBD.  She takes dalfampridine but does not really have much of a sense of whether it helps or not in terms of her walking.  She says she has been off and on it several times over the years and currently is taking it just once a day as she finds it interferes with her sleep.  When I inquire whether her walking is any different now than it was 1-2 years ago, she thinks it is a little bit worse.  She used to use her cane more often and feels that she does not use it much now because her balance is worse.    PAST MEDICAL HISTORY:    1.  Multiple sclerosis.  2.  Coronary artery disease, status post stents.  3.  Hypertension.  4.  Arthritis in the knee.    FAMILY HISTORY:  No family history of multiple sclerosis.    SOCIAL HISTORY:  She is  with 3 adult sons and lives with her  in [place].  She is a retired .  She quit smoking nearly 35 years ago.    GENERAL REVIEW OF SYSTEMS:  She has some mild anxiety.  Energy level is good and appetite is normal.  She has always been a light sleeper, but has no pathologic insomnia.  No dry eyes or dry mouth.  Intermittent chest pain, she had a stress test last week.  No cough, dyspnea, abdominal pain, nausea or vomiting.  No rashes.  She has arthralgias in the right knee.    PHYSICAL EXAMINATION:  Blood pressure 138/69, pulse 61, weight 144 pounds.  She is a well-developed, age-appropriate  woman accompanied by her  and in no apparent distress.  The sclerae are anicteric.  The oropharynx is clear and the neck is supple without adenopathy, thyromegaly or carotid bruits.  Chest is clear to auscultation.  Cardiac rate and rhythm are normal without murmurs or rubs.    NEUROLOGIC:  She is alert and oriented.  Affect is bright and language functions are normal.  She recalls 3/3 items after delay and scores 5/5 on serial 7 calculation.  Pupils are equal and normally reactive without  afferent pupillary defect.  The ophthalmoscopic exam is unremarkable.  Eye movements are full without diplopia or nystagmus.  Facial strength and sensation are normal.  There is no dysphonia or dysarthria.  Muscle bulk and tone are normal.  Strength in the arms is normal except for trace weakness of finger abduction bilaterally.  Hip flexion is weak bilaterally at 4/5.  Knee flexion and ankle dorsiflexion strength are normal.  Finger tapping is normal, but toe tapping is slow bilaterally.  Finger-nose-finger and heel-to-shin are normal.  Light touch, pinprick, and joint position sense are all normal in the hands and feet.  Deep tendon reflexes are 2/4 and symmetric at the biceps, triceps, brachioradialis and knees.  They are asymmetric at the ankles, brisker on the right.  Her gait is normal-based and stable with her walker, but unsteady and ataxic without it.  There is no spastic or hemiparetic component.  Romberg sign is mildly positive.    I reviewed medical records including office notes from the [facility] and MRIs of the brain and spinal cord.  Most recent brain MRI from 2019 shows a low burden of T2 hyperintense lesions consistent with multiple sclerosis.  There was no change compared to 2016.  The cervical and thoracic spinal cord both show scattered T2 hyperintense lesions typical of MS.    IMPRESSION:  Multiple sclerosis, phenotype not exactly clear, but most likely secondary progressive as has been the opinion of her prior neurologists as well.  I spent 74 minutes on her care on the date of service, which included chart review and face-to-face time.  We discussed secondary progressive MS and the limited options for managing that disorder.  One thing that we do in everyone is symptomatic management and that seems under pretty good control.  It is unclear to me if the dalfampridine is helping.  I went over the mechanism of action of that and the fact that only about a third of patients  notice a benefit.  Ultimately, I said if she is not sure it is helping, I recommend stopping it and if she does not notice a difference over the next few weeks to stay off it completely.  We discussed the lack of any proven/FDA approved very effective medicine at slowing the progressive stage of the disease.  We discussed ocrelizumab, which is approved for primary progressive MS, but showed a modest benefit in patients who were young and with a short disease duration.  We discussed clinical trials and medications in the pipeline with some evidence for benefit in progressive MS, focusing on mastinib.  We discussed the use of unproven treatments with some evidence of benefit, focusing on pulse corticosteroids.  I told her if I was convinced she was experiencing ongoing progression that is probably what I would recommend.  She has had DEXA scans in the past showing osteopenia, so I would only recommend trying the steroids if there was clear evidence of ongoing progression.  Finally, they asked about medications aimed at myelin repair and we discussed those as well.    PLAN:  She requests to continue her care here and that is fine.  We opted to make no changes in her management for now, and will evaluate for signs of ongoing progression and consider our options at that point.    Saúl Da Silva MD        D: 2021   T: 2021   MT: corrina    Name:     DENISE LOPEZVitaliy  MRN:      [id number]        Account:      [id number]   :      1950           Service Date: 2021       Document: [id number]

## 2021-11-29 NOTE — PROGRESS NOTES
Service Date: 11/23/2021    REASON FOR VISIT:  Vivien Gilbert is a 71-year-old woman seen on a self referred basis for neurologic consultation regarding multiple sclerosis.    HISTORY OF PRESENT ILLNESS:  Vivien tells me she initially developed symptoms of MS (Lhermitte's) in her mid 30s, over 30 years ago.  She was followed for MS for many years by Dr. Leger and more recently by Dr. Olivia at [facility].  She says she is seeking a new MS neurologist for geographic convenience mainly.  When I ask about her history of MS, she does not really describe much in terms of attacks/relapses, except in the context of illness when her walking would be impaired until she recovered from the infection.  Her  reminds her of an attack in 1989 or 1990 when she had trouble walking and also speaking and was given steroids.  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  She has been off immunotherapy in recent years because of the conclusion that she was past the relapsing stage of the disease, and that is almost certainly correct.  She was previously treated with Avonex, double-dose Avonex, and Rebif.  In terms of residual symptoms, she has gait impairment, which she attributes primarily to poor balance.  She says she cannot walk without support and generally uses her walker at all times.  She has numbness in her hands and feet and clumsiness in her hands, such that she finds it hard to play the piano.  She has neurogenic bladder with both over- and under- active components.  She just started doing intermittent catheterization at the advice of her urologist and she is not liking that very much, but apparently just started in the past few weeks.  She is on prophylactic antibiotics.  She has rare spasms in the legs, which upon further questioning is more of a twitching than a true cramp like muscle spasm.  Those are improved with over-the-counter CBD.  She takes  dalfampridine but does not really have much of a sense of whether it helps or not in terms of her walking.  She says she has been off and on it several times over the years and currently is taking it just once a day as she finds it interferes with her sleep.  When I inquire whether her walking is any different now than it was 1-2 years ago, she thinks it is a little bit worse.  She used to use her cane more often and feels that she does not use it much now because her balance is worse.    PAST MEDICAL HISTORY:    1.  Multiple sclerosis.  2.  Coronary artery disease, status post stents.  3.  Hypertension.  4.  Arthritis in the knee.    FAMILY HISTORY:  No family history of multiple sclerosis.    SOCIAL HISTORY:  She is  with 3 adult sons and lives with her  in [place].  She is a retired .  She quit smoking nearly 35 years ago.    GENERAL REVIEW OF SYSTEMS:  She has some mild anxiety.  Energy level is good and appetite is normal.  She has always been a light sleeper, but has no pathologic insomnia.  No dry eyes or dry mouth.  Intermittent chest pain, she had a stress test last week.  No cough, dyspnea, abdominal pain, nausea or vomiting.  No rashes.  She has arthralgias in the right knee.    PHYSICAL EXAMINATION:  Blood pressure 138/69, pulse 61, weight 144 pounds.  She is a well-developed, age-appropriate  woman accompanied by her  and in no apparent distress.  The sclerae are anicteric.  The oropharynx is clear and the neck is supple without adenopathy, thyromegaly or carotid bruits.  Chest is clear to auscultation.  Cardiac rate and rhythm are normal without murmurs or rubs.    NEUROLOGIC:  She is alert and oriented.  Affect is bright and language functions are normal.  She recalls 3/3 items after delay and scores 5/5 on serial 7 calculation.  Pupils are equal and normally reactive without afferent pupillary defect.  The ophthalmoscopic exam is unremarkable.  Eye movements  are full without diplopia or nystagmus.  Facial strength and sensation are normal.  There is no dysphonia or dysarthria.  Muscle bulk and tone are normal.  Strength in the arms is normal except for trace weakness of finger abduction bilaterally.  Hip flexion is weak bilaterally at 4/5.  Knee flexion and ankle dorsiflexion strength are normal.  Finger tapping is normal, but toe tapping is slow bilaterally.  Finger-nose-finger and heel-to-shin are normal.  Light touch, pinprick, and joint position sense are all normal in the hands and feet.  Deep tendon reflexes are 2/4 and symmetric at the biceps, triceps, brachioradialis and knees.  They are asymmetric at the ankles, brisker on the right.  Her gait is normal-based and stable with her walker, but unsteady and ataxic without it.  There is no spastic or hemiparetic component.  Romberg sign is mildly positive.    I reviewed medical records including office notes from the [facility] and MRIs of the brain and spinal cord.  Most recent brain MRI from 2019 shows a low burden of T2 hyperintense lesions consistent with multiple sclerosis.  There was no change compared to 2016.  The cervical and thoracic spinal cord both show scattered T2 hyperintense lesions typical of MS.    IMPRESSION:  Multiple sclerosis, phenotype not exactly clear, but most likely secondary progressive as has been the opinion of her prior neurologists as well.  I spent 74 minutes on her care on the date of service, which included chart review and face-to-face time.  We discussed secondary progressive MS and the limited options for managing that disorder.  One thing that we do in everyone is symptomatic management and that seems under pretty good control.  It is unclear to me if the dalfampridine is helping.  I went over the mechanism of action of that and the fact that only about a third of patients notice a benefit.  Ultimately, I said if she is not sure it is helping, I recommend  stopping it and if she does not notice a difference over the next few weeks to stay off it completely.  We discussed the lack of any proven/FDA approved very effective medicine at slowing the progressive stage of the disease.  We discussed ocrelizumab, which is approved for primary progressive MS, but showed a modest benefit in patients who were young and with a short disease duration.  We discussed clinical trials and medications in the pipeline with some evidence for benefit in progressive MS, focusing on mastinib.  We discussed the use of unproven treatments with some evidence of benefit, focusing on pulse corticosteroids.  I told her if I was convinced she was experiencing ongoing progression that is probably what I would recommend.  She has had DEXA scans in the past showing osteopenia, so I would only recommend trying the steroids if there was clear evidence of ongoing progression.  Finally, they asked about medications aimed at myelin repair and we discussed those as well.    PLAN:  She requests to continue her care here and that is fine.  We opted to make no changes in her management for now, and will evaluate for signs of ongoing progression and consider our options at that point.    Saúl Da Silva MD        D: 2021   T: 2021   MT: corrina    Name:     DENISE LOPEZ  MRN:      [id number]        Account:      [id number]   :      1950           Service Date: 2021       Document: [id number]

## 2022-03-22 ENCOUNTER — MYC MEDICAL ADVICE (OUTPATIENT)
Dept: NEUROLOGY | Facility: CLINIC | Age: 72
End: 2022-03-22
Payer: MEDICARE

## 2022-03-22 DIAGNOSIS — G35 MULTIPLE SCLEROSIS (H): Primary | ICD-10-CM

## 2022-03-23 RX ORDER — DALFAMPRIDINE 10 MG/1
10 TABLET, FILM COATED, EXTENDED RELEASE ORAL 2 TIMES DAILY
Qty: 60 TABLET | Refills: 3 | Status: SHIPPED | OUTPATIENT
Start: 2022-03-23 | End: 2022-11-01

## 2022-03-23 NOTE — TELEPHONE ENCOUNTER
Patient requesting refill of their dalfampridine; Patient was last seen in November and has follow up appointment in April with Dr. Da Silva/ Pended rx to Dr. Da Silva for signature as this medication was not previously prescribed by him.    Maggie Mackenzie RN

## 2022-03-29 ENCOUNTER — OFFICE VISIT (OUTPATIENT)
Dept: NEUROLOGY | Facility: CLINIC | Age: 72
End: 2022-03-29
Attending: PSYCHIATRY & NEUROLOGY
Payer: MEDICARE

## 2022-03-29 VITALS
BODY MASS INDEX: 25.62 KG/M2 | OXYGEN SATURATION: 98 % | HEIGHT: 63 IN | WEIGHT: 144.62 LBS | SYSTOLIC BLOOD PRESSURE: 127 MMHG | DIASTOLIC BLOOD PRESSURE: 81 MMHG | HEART RATE: 53 BPM

## 2022-03-29 DIAGNOSIS — G35 MS (MULTIPLE SCLEROSIS) (H): Primary | ICD-10-CM

## 2022-03-29 DIAGNOSIS — Z91.81 RISK FOR FALLS: ICD-10-CM

## 2022-03-29 DIAGNOSIS — M85.80 OSTEOPENIA, UNSPECIFIED LOCATION: ICD-10-CM

## 2022-03-29 PROCEDURE — G0463 HOSPITAL OUTPT CLINIC VISIT: HCPCS

## 2022-03-29 PROCEDURE — 99214 OFFICE O/P EST MOD 30 MIN: CPT | Performed by: PSYCHIATRY & NEUROLOGY

## 2022-03-29 RX ORDER — PREDNISONE 50 MG/1
1000 TABLET ORAL
Qty: 60 TABLET | Refills: 3 | Status: SHIPPED | OUTPATIENT
Start: 2022-03-29 | End: 2022-12-15

## 2022-03-29 ASSESSMENT — PAIN SCALES - GENERAL: PAINLEVEL: NO PAIN (0)

## 2022-03-29 NOTE — Clinical Note
3/29/2022       RE: Vivien Gilbert  [address] [facility] [address]     Dear Colleague,    Thank you for referring your patient, Vivien Gilbert, to the [facility]. Please see a copy of my visit note below.    No notes on file    Again, thank you for allowing me to participate in the care of your patient.      Sincerely,    Saúl Da Silva MD

## 2022-03-29 NOTE — LETTER
3/29/2022      RE: Vivien Gilbert  [address] [facility] [address]       Service Date: 03/29/2022    REASON FOR VISIT:  Vivien Gilbert is a 71-year-old woman whom I have seen once previously in 11/2021 for multiple sclerosis.  She returns to discuss treatment options.    HISTORY OF PRESENT ILLNESS:  When I met Vivien in late November, she described a gradual worsening of her walking over the previous couple of years, mainly in that she was requiring her walker essentially at all times and could no longer use her cane because her balance has gotten worse.  She has longstanding MS of over 30 years' duration.  Her exam was notable for some hip flexion weakness bilaterally and some clumsy toe tapping as well as some gait ataxia.  It was not clear to me whether her MS was continuing to show progression or if the walking changes were due to static deficits from MS combined with normal aging.      I had discussed with her the limited treatment options in progressive MS and mentioned that I sometimes try pulsed corticosteroids, but that I would like to see clear evidence of ongoing MS disease progression before considering that.      She recently called back requesting to start steroids because of a feeling she was continuing to get worse.  The reports she gave to the nurse was basically the same as she told me in the fall, that she used to have days when she could get by with her cane, but that is not the case anymore.  She made this appointment was to discuss the issue further.      She had a DEXA scan in 01/2020 with osteopenia, with T-scores of -2.3 at the femoral neck and total hip.  The purpose of this visit was to discuss that possibility.    She continues with Physical Therapy and has an AFO for her left leg.  She has started doing an occupational therapy study online from [facility] [employer].  She continues with intermittent catheterization and says she is getting better at that.  She is not comfortable  "driving because she thinks her feet are not working well enough.  She is beginning to look into getting hand controls.    PHYSICAL EXAMINATION:  Blood pressure 127/81, pulse inches, weight 144 pounds.  She is alert and oriented.  Affect is bright and language functions are normal.  Cranial nerves are unremarkable.  Muscle bulk and tone are normal.  There is weakness of finger abduction bilaterally and of hip flexion bilaterally at 4/5.  There was trace weakness of ankle dorsiflexion strength on the left.  Finger tapping was normal and toe tapping was a bit slow.  Gait remains normal with a walker, but unsteady without it.  Overall, her exam really has not changed.    IMPRESSION:  Multiple sclerosis with ongoing neurologic subjective worsening without change and objective exam findings.  This could be due to ongoing disease progression, but that would be a little unlikely at her age and disease duration.    I spent 35 minutes on her care on the date of service, including chart review and face-to-face time.  We discussed the pros and cons of using pulsed corticosteroids and treating progressive MS.  First, this is not an FDA-approved treatment.  Older studies were inconsistent with some showing some benefit and others not.  I told her I am convinced that a minority of patients benefit from slowing or stopping progression in the context of those.  The downside is with the side effects.  She already has osteopenia.  The dilemma we face when trying to treat progressive MS with corticosteroids is that we are trying to preserve neurologic function, mainly walking, but the treatment itself can worsen the consequences of falling because of its effect on bone density.      I told her that I usually try it in durations of 6 or 12 months and that it is notoriously difficult to tell if any particular treatment is \"working\" in progressive MS.  I told her that patients often experience a short-term \"bump\" in energy and strength " for a week or two after each dose and often get somewhat fixated on that, but that is more of a side effect of the treatment rather than the intended effect, which is to induce long-term stability.  She wishes to proceed with pulsed corticosteroids and we will go ahead and give that a try.    PLAN:    1.  Prednisone 1000 mg by mouth 1 day per month.  Common and rare but serious side effects were discussed.  2.  Follow up in 6 months.    Saúl Da Silva MD        D: 2022   T: 2022   MT: ABRIL    Name:     DENISE LOPEZVitaliy  MRN:      [id number]        Account:      [id number]   :      1950           Service Date: 2022       Document: [id number]

## 2022-04-04 NOTE — PROGRESS NOTES
Service Date: 03/29/2022    REASON FOR VISIT:  Vivien Gilbert is a 71-year-old woman whom I have seen once previously in 11/2021 for multiple sclerosis.  She returns to discuss treatment options.    HISTORY OF PRESENT ILLNESS:  When I met Vivien in late November, she described a gradual worsening of her walking over the previous couple of years, mainly in that she was requiring her walker essentially at all times and could no longer use her cane because her balance has gotten worse.  She has longstanding MS of over 30 years' duration.  Her exam was notable for some hip flexion weakness bilaterally and some clumsy toe tapping as well as some gait ataxia.  It was not clear to me whether her MS was continuing to show progression or if the walking changes were due to static deficits from MS combined with normal aging.      I had discussed with her the limited treatment options in progressive MS and mentioned that I sometimes try pulsed corticosteroids, but that I would like to see clear evidence of ongoing MS disease progression before considering that.      She recently called back requesting to start steroids because of a feeling she was continuing to get worse.  The reports she gave to the nurse was basically the same as she told me in the fall, that she used to have days when she could get by with her cane, but that is not the case anymore.  She made this appointment was to discuss the issue further.      She had a DEXA scan in 01/2020 with osteopenia, with T-scores of -2.3 at the femoral neck and total hip.  The purpose of this visit was to discuss that possibility.    She continues with Physical Therapy and has an AFO for her left leg.  She has started doing an occupational therapy study online from [facility].  She continues with intermittent catheterization and says she is getting better at that.  She is not comfortable driving because she thinks her feet are not working well enough.  She is beginning to look  "into getting hand controls.    PHYSICAL EXAMINATION:  Blood pressure 127/81, pulse inches, weight 144 pounds.  She is alert and oriented.  Affect is bright and language functions are normal.  Cranial nerves are unremarkable.  Muscle bulk and tone are normal.  There is weakness of finger abduction bilaterally and of hip flexion bilaterally at 4/5.  There was trace weakness of ankle dorsiflexion strength on the left.  Finger tapping was normal and toe tapping was a bit slow.  Gait remains normal with a walker, but unsteady without it.  Overall, her exam really has not changed.    IMPRESSION:  Multiple sclerosis with ongoing neurologic subjective worsening without change and objective exam findings.  This could be due to ongoing disease progression, but that would be a little unlikely at her age and disease duration.    I spent 35 minutes on her care on the date of service, including chart review and face-to-face time.  We discussed the pros and cons of using pulsed corticosteroids and treating progressive MS.  First, this is not an FDA-approved treatment.  Older studies were inconsistent with some showing some benefit and others not.  I told her I am convinced that a minority of patients benefit from slowing or stopping progression in the context of those.  The downside is with the side effects.  She already has osteopenia.  The dilemma we face when trying to treat progressive MS with corticosteroids is that we are trying to preserve neurologic function, mainly walking, but the treatment itself can worsen the consequences of falling because of its effect on bone density.      I told her that I usually try it in durations of 6 or 12 months and that it is notoriously difficult to tell if any particular treatment is \"working\" in progressive MS.  I told her that patients often experience a short-term \"bump\" in energy and strength for a week or two after each dose and often get somewhat fixated on that, but that is more of " a side effect of the treatment rather than the intended effect, which is to induce long-term stability.  She wishes to proceed with pulsed corticosteroids and we will go ahead and give that a try.    PLAN:    1.  Prednisone 1000 mg by mouth 1 day per month.  Common and rare but serious side effects were discussed.  2.  Follow up in 6 months.    Saúl Da Silva MD        D: 2022   T: 2022   MT: ABRIL    Name:     DENISE LOPEZVitaliy  MRN:      [id number]        Account:      [id number]   :      1950           Service Date: 2022       Document: [id number]

## 2022-04-24 ENCOUNTER — HEALTH MAINTENANCE LETTER (OUTPATIENT)
Age: 72
End: 2022-04-24

## 2022-11-01 ENCOUNTER — OFFICE VISIT (OUTPATIENT)
Dept: NEUROLOGY | Facility: CLINIC | Age: 72
End: 2022-11-01
Attending: PSYCHIATRY & NEUROLOGY
Payer: MEDICARE

## 2022-11-01 VITALS
HEART RATE: 53 BPM | HEIGHT: 63 IN | DIASTOLIC BLOOD PRESSURE: 55 MMHG | WEIGHT: 142 LBS | BODY MASS INDEX: 25.16 KG/M2 | SYSTOLIC BLOOD PRESSURE: 144 MMHG | OXYGEN SATURATION: 100 %

## 2022-11-01 DIAGNOSIS — R20.2 PARESTHESIA: ICD-10-CM

## 2022-11-01 DIAGNOSIS — G35 MS (MULTIPLE SCLEROSIS) (H): Primary | ICD-10-CM

## 2022-11-01 DIAGNOSIS — N31.9 NEUROGENIC BLADDER: ICD-10-CM

## 2022-11-01 PROCEDURE — G0463 HOSPITAL OUTPT CLINIC VISIT: HCPCS

## 2022-11-01 PROCEDURE — 99215 OFFICE O/P EST HI 40 MIN: CPT | Performed by: PSYCHIATRY & NEUROLOGY

## 2022-11-01 ASSESSMENT — PAIN SCALES - GENERAL: PAINLEVEL: NO PAIN (0)

## 2022-11-01 NOTE — PROGRESS NOTES
Service Date: 11/01/2022    PROGRESS NOTE    REASON FOR VISIT:  Vivien Gilbert is a 72-year-old woman whom I follow for multiple sclerosis.  She returns for routine followup.  I last saw her in 04/2022.    HISTORY OF PRESENT ILLNESS:  At last visit, we started pulsed corticosteroids with prednisone 1000 mg once a month.  She says those are going okay.  She hates the taste of them but has figured out a way to get them down while minimizing that.  She does notice an energy boost for 3 or 4 days.  We started those because of subjective worsening on her part that really meant more reliance on her walker.  Her balance is poor without using it and she estimates that being able to go 10 steps with a cane without having to stop or grab a wall is about the best that she could do.  With the rollator walker, she can walk quickly and stably for quite some distance.  Her hands are a bit numb and she has noticed typing is worse.  She is doing intermittent straight catheterization 2-3 times per day, basically before bed and before going out somewhere.  That is going okay.  She did have one rather severe urinary tract infection.  Vision, speech and swallowing are doing okay.  She has some mild memory difficulty and gives an example how she cannot remember whether she has seen a movie or a TV show previously.  She continues to use a left AFO but did not bring it today.  She spent the summer in [place] where she was born.    PHYSICAL EXAMINATION:    VITAL SIGNS:  Blood pressure 144/55, pulse 53, weight 142 pounds.    NEUROLOGIC:  She is alert and oriented.  Affect is bright and language functions are normal.  Cranial nerves are unremarkable.  Muscle bulk and tone are normal.  There is weakness of hip flexion bilaterally at 4/5 and trace weakness of finger abduction but otherwise strength in the arms and legs was normal.  There was perhaps trace weakness of ankle dorsiflexion on the left.  Finger tapping, toe tapping and  finger-nose-finger are normal.  Reflexes are brisk and symmetric at the elbows and knees.  Her walking with the rollator walker was narrow-based and stable with a timed 25-foot walk of 9.4 seconds.  I did not have her attempt to walk without the walker.  Romberg sign was mildly positive.    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  I spent 42 minutes on her care today including chart review, face-to-face and documentation time.  We discussed the pulsed steroids going forward.  Her exam is stable since starting it and I told her that since that is what the goal is in progressive MS, I see no reason to stop them at this point.  We discussed safety monitoring and I suggested she get a repeat DEXA some time next spring or summer.  We discussed the masitinib study in progressive MS.  I am not sure if she would meet inclusion criteria from an age standpoint but she did have some theoretical interest in the trial.  I told her it almost certainly would require stopping the pulsed steroids.  She asked about the need for MRI monitoring going forward and I told her it is not necessary or helpful once the relapsing stage of MS has passed, as it certainly has in her.  She asked about her numbness in her feet and whether this was due to MS or to her cardiovascular disease.  I told her that it is difficult to tell for certain but I certainly suspect MS is at least playing a role.    PLAN:  Follow up in 1 year.    Saúl Da Silva MD        D: 2022   T: 2022   MT: zoie    Name:     DENISE LOPEZ  MRN:      [id number]        Account:      [id number]   :      1950           Service Date: 2022       Document: [id number]

## 2022-11-01 NOTE — LETTER
11/1/2022       RE: Vivien Gilbert  [address] [facility] [address]     Dear Colleague,    Thank you for referring your patient, Vivien Gilbert, to the [facility] MULTIPLE [facility] [place] [facility]. Please see a copy of my visit note below.    Service Date: 11/01/2022    PROGRESS NOTE    REASON FOR VISIT:  Vivien Gilbert is a 72-year-old woman whom I follow for multiple sclerosis.  She returns for routine followup.  I last saw her in 04/2022.    HISTORY OF PRESENT ILLNESS:  At last visit, we started pulsed corticosteroids with prednisone 1000 mg once a month.  She says those are going okay.  She hates the taste of them but has figured out a way to get them down while minimizing that.  She does notice an energy boost for 3 or 4 days.  We started those because of subjective worsening on her part that really meant more reliance on her walker.  Her balance is poor without using it and she estimates that being able to go 10 steps with a cane without having to stop or grab a wall is about the best that she could do.  With the rollator walker, she can walk quickly and stably for quite some distance.  Her hands are a bit numb and she has noticed typing is worse.  She is doing intermittent straight catheterization 2-3 times per day, basically before bed and before going out somewhere.  That is going okay.  She did have one rather severe urinary tract infection.  Vision, speech and swallowing are doing okay.  She has some mild memory difficulty and gives an example how she cannot remember whether she has seen a movie or a TV show previously.  She continues to use a left AFO but did not bring it today.  She spent the summer in [place] where she was born.    PHYSICAL EXAMINATION:    VITAL SIGNS:  Blood pressure 144/55, pulse 53, weight 142 pounds.    NEUROLOGIC:  She is alert and oriented.  Affect is bright and language functions are normal.   Cranial nerves are unremarkable.  Muscle bulk and tone are normal.  There is weakness of hip flexion bilaterally at 4/5 and trace weakness of finger abduction but otherwise strength in the arms and legs was normal.  There was perhaps trace weakness of ankle dorsiflexion on the left.  Finger tapping, toe tapping and finger-nose-finger are normal.  Reflexes are brisk and symmetric at the elbows and knees.  Her walking with the rollator walker was narrow-based and stable with a timed 25-foot walk of 9.4 seconds.  I did not have her attempt to walk without the walker.  Romberg sign was mildly positive.    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  I spent 42 minutes on her care today including chart review, face-to-face and documentation time.  We discussed the pulsed steroids going forward.  Her exam is stable since starting it and I told her that since that is what the goal is in progressive MS, I see no reason to stop them at this point.  We discussed safety monitoring and I suggested she get a repeat DEXA some time next spring or summer.  We discussed the masitinib study in progressive MS.  I am not sure if she would meet inclusion criteria from an age standpoint but she did have some theoretical interest in the trial.  I told her it almost certainly would require stopping the pulsed steroids.  She asked about the need for MRI monitoring going forward and I told her it is not necessary or helpful once the relapsing stage of MS has passed, as it certainly has in her.  She asked about her numbness in her feet and whether this was due to MS or to her cardiovascular disease.  I told her that it is difficult to tell for certain but I certainly suspect MS is at least playing a role.    PLAN:  Follow up in 1 year.    Saúl Da Silva MD        D: 11/01/2022   T: 11/01/2022   MT: zoie    Name:     DENISE LOPEZ  MRN:      [id number]        Account:      [id number]    :      1950           Service Date: 2022       Document: [id number]

## 2022-11-19 ENCOUNTER — HEALTH MAINTENANCE LETTER (OUTPATIENT)
Age: 72
End: 2022-11-19

## 2022-12-14 ENCOUNTER — MYC MEDICAL ADVICE (OUTPATIENT)
Dept: NEUROLOGY | Facility: CLINIC | Age: 72
End: 2022-12-14

## 2022-12-14 DIAGNOSIS — G35 MS (MULTIPLE SCLEROSIS) (H): ICD-10-CM

## 2022-12-15 RX ORDER — PREDNISONE 50 MG/1
1000 TABLET ORAL
Qty: 60 TABLET | Refills: 3 | Status: SHIPPED | OUTPATIENT
Start: 2022-12-15

## 2022-12-15 NOTE — TELEPHONE ENCOUNTER
Received refill request for prednisone from [facility]; Patient was last seen in November and has follow up appointment next October with Dr. Da Silva. Pended to Dr. Da Silva for signature.    Maggie Mackenzie RN

## 2023-03-17 ENCOUNTER — TRANSFERRED RECORDS (OUTPATIENT)
Dept: HEALTH INFORMATION MANAGEMENT | Facility: CLINIC | Age: 73
End: 2023-03-17

## 2023-06-01 ENCOUNTER — HEALTH MAINTENANCE LETTER (OUTPATIENT)
Age: 73
End: 2023-06-01

## 2023-10-31 ENCOUNTER — OFFICE VISIT (OUTPATIENT)
Dept: NEUROLOGY | Facility: CLINIC | Age: 73
End: 2023-10-31
Attending: PSYCHIATRY & NEUROLOGY
Payer: MEDICARE

## 2023-10-31 VITALS
WEIGHT: 142.2 LBS | DIASTOLIC BLOOD PRESSURE: 66 MMHG | HEART RATE: 61 BPM | SYSTOLIC BLOOD PRESSURE: 152 MMHG | BODY MASS INDEX: 25.19 KG/M2 | OXYGEN SATURATION: 99 %

## 2023-10-31 DIAGNOSIS — N31.9 NEUROGENIC BLADDER: ICD-10-CM

## 2023-10-31 DIAGNOSIS — Z91.81 RISK FOR FALLS: ICD-10-CM

## 2023-10-31 DIAGNOSIS — G35 MS (MULTIPLE SCLEROSIS) (H): Primary | ICD-10-CM

## 2023-10-31 PROCEDURE — G0463 HOSPITAL OUTPT CLINIC VISIT: HCPCS | Performed by: PSYCHIATRY & NEUROLOGY

## 2023-10-31 PROCEDURE — 99215 OFFICE O/P EST HI 40 MIN: CPT | Performed by: PSYCHIATRY & NEUROLOGY

## 2023-10-31 RX ORDER — TAMSULOSIN HYDROCHLORIDE 0.4 MG/1
CAPSULE ORAL
COMMUNITY

## 2023-10-31 RX ORDER — ALENDRONATE SODIUM 70 MG/1
70 TABLET ORAL
COMMUNITY
Start: 2023-03-23

## 2023-10-31 ASSESSMENT — PAIN SCALES - GENERAL: PAINLEVEL: NO PAIN (0)

## 2023-10-31 NOTE — PROGRESS NOTES
ID: Vivien Gilbert is a 73-year-old woman with follow for multiple sclerosis.  She returns for annual follow-up.    HPI: Vivien continues to feel that her MS is still slowly worsening.  Mainly this seems to be some changes in her balance or coordination.  We have started pulsed steroids about 18 months ago, but she says a repeat DEXA scan showed worsened bone mineral density, and we decided to stop the steroids today.  She has numbness and tingling in her hands and feet and the hands are bit clumsy.  She is using her walker all the time, including at home.  She continues intermittent bladder catheterizations, says she has had multiple UTIs over the past year.  She notes some worsening of her memory and we discussed that in detail today.    Past Medical History:   Diagnosis Date     Hypertension    MS      Current Outpatient Medications:      alendronate (FOSAMAX) 70 MG tablet, Take 70 mg by mouth, Disp: , Rfl:      aspirin 81 MG chewable tablet, Take 81 mg by mouth, Disp: , Rfl:      baclofen (LIORESAL) 10 MG tablet, Take 10 mg by mouth 3 times daily, Disp: , Rfl:      buPROPion (WELLBUTRIN SR) 100 MG 12 hr tablet, Take 75 mg by mouth , Disp: , Rfl:      calcium citrate (CITRACAL) 950 (200 Ca) MG tablet, 100 mg daily, Disp: , Rfl:      Cholecalciferol (VITAMIN D3) 2000 units CAPS, Take 2,000 Units by mouth, Disp: , Rfl:      losartan (COZAAR) 50 MG tablet, Take 100 mg by mouth , Disp: , Rfl:      nitroGLYcerin (NITROSTAT) 0.4 MG sublingual tablet, Place 0.4 mg under the tongue, Disp: , Rfl:      predniSONE (DELTASONE) 50 MG tablet, Take 20 tablets (1,000 mg) by mouth every 30 days, Disp: 60 tablet, Rfl: 3     simvastatin (ZOCOR) 40 MG tablet, , Disp: , Rfl:      tamsulosin (FLOMAX) 0.4 MG capsule, Take 1 capsule every day by oral route., Disp: , Rfl:      Exam: She is alert and oriented.  Affect is bright and I am functions are normal.  Cranial nerves are unremarkable.  Muscle bulk and tone are normal.  Strength is  as follows (right/left): Deltoid 5/5 bicep 5/5 finger abductors/4 flexors 4/4 knee flexors 5/5 ankle dorsiflexors 5/4.  Finger tapping is normal but toe tapping is clumsy on the left.  Finger-nose-finger is normal.  Light touch is intact in all 4 limbs.  Reflexes are normal and symmetric at the elbows, asymmetric at the knees (brisker on the left) and absent at the ankles.  She has an ataxic gait without spastic or hemiparetic component.  Timed complex walk was done and 8.9 seconds with her walker, compared to 9.4 seconds last year.    Impression: Multiple sclerosis, secondary progressive, subjectively with slow worsening but objectively basically stable.  I spent 45 minutes on her care on the date of service including chart review and face-to-face time.  We discussed the steroids in light of her osteoporosis.  Her exam has been stable while on the steroids but I told her given the uncertain efficacy at this stage of MS I agree with stopping them and continuing to follow her clinically.  We discussed her memory complaints and cognition and MS in general.  I showed her the brain HQ website and recommended she try that.  She has about repeat MRI scanning and we discussed that; I told her I do not think MRI provide any actionable information once the relapsing stage of MS is past, like it certainly is in her.    Plan: Stop steroids.  Follow-up in 1 year.    This note was completed in part using voice-recognition software, and some typographic errors may be present as a result.

## 2023-10-31 NOTE — LETTER
10/31/2023       RE: Vivien Gilbert  [address] [facility] [address]     Dear Colleague,    Thank you for referring your patient, Vivien Gilbert, to the [facility] MULTIPLE SCLEROSIS CLINIC [place] at [facility]. Please see a copy of my visit note below.    ID: Vivien Gilbert is a 73-year-old woman with follow for multiple sclerosis.  She returns for annual follow-up.    HPI: Vivien continues to feel that her MS is still slowly worsening.  Mainly this seems to be some changes in her balance or coordination.  We have started pulsed steroids about 18 months ago, but she says a repeat DEXA scan showed worsened bone mineral density, and we decided to stop the steroids today.  She has numbness and tingling in her hands and feet and the hands are bit clumsy.  She is using her walker all the time, including at home.  She continues intermittent bladder catheterizations, says she has had multiple UTIs over the past year.  She notes some worsening of her memory and we discussed that in detail today.    Past Medical History:   Diagnosis Date    Hypertension    MS      Current Outpatient Medications:     alendronate (FOSAMAX) 70 MG tablet, Take 70 mg by mouth, Disp: , Rfl:     aspirin 81 MG chewable tablet, Take 81 mg by mouth, Disp: , Rfl:     baclofen (LIORESAL) 10 MG tablet, Take 10 mg by mouth 3 times daily, Disp: , Rfl:     buPROPion (WELLBUTRIN SR) 100 MG 12 hr tablet, Take 75 mg by mouth , Disp: , Rfl:     calcium citrate (CITRACAL) 950 (200 Ca) MG tablet, 100 mg daily, Disp: , Rfl:     Cholecalciferol (VITAMIN D3) 2000 units CAPS, Take 2,000 Units by mouth, Disp: , Rfl:     losartan (COZAAR) 50 MG tablet, Take 100 mg by mouth , Disp: , Rfl:     nitroGLYcerin (NITROSTAT) 0.4 MG sublingual tablet, Place 0.4 mg under the tongue, Disp: , Rfl:     predniSONE (DELTASONE) 50 MG tablet, Take 20 tablets (1,000 mg) by mouth every 30 days, Disp: 60 tablet,  Rfl: 3    simvastatin (ZOCOR) 40 MG tablet, , Disp: , Rfl:     tamsulosin (FLOMAX) 0.4 MG capsule, Take 1 capsule every day by oral route., Disp: , Rfl:      Exam: She is alert and oriented.  Affect is bright and I am functions are normal.  Cranial nerves are unremarkable.  Muscle bulk and tone are normal.  Strength is as follows (right/left): Deltoid 5/5 bicep 5/5 finger abductors/4 flexors 4/4 knee flexors 5/5 ankle dorsiflexors 5/4.  Finger tapping is normal but toe tapping is clumsy on the left.  Finger-nose-finger is normal.  Light touch is intact in all 4 limbs.  Reflexes are normal and symmetric at the elbows, asymmetric at the knees (brisker on the left) and absent at the ankles.  She has an ataxic gait without spastic or hemiparetic component.  Timed complex walk was done and 8.9 seconds with her walker, compared to 9.4 seconds last year.    Impression: Multiple sclerosis, secondary progressive, subjectively with slow worsening but objectively basically stable.  I spent 45 minutes on her care on the date of service including chart review and face-to-face time.  We discussed the steroids in light of her osteoporosis.  Her exam has been stable while on the steroids but I told her given the uncertain efficacy at this stage of MS I agree with stopping them and continuing to follow her clinically.  We discussed her memory complaints and cognition and MS in general.  I showed her the brain HQ website and recommended she try that.  She has about repeat MRI scanning and we discussed that; I told her I do not think MRI provide any actionable information once the relapsing stage of MS is past, like it certainly is in her.    Plan: Stop steroids.  Follow-up in 1 year.    This note was completed in part using voice-recognition software, and some typographic errors may be present as a result.        Again, thank you for allowing me to participate in the care of your patient.      Sincerely,    Saúl Da Silva,  MD

## 2023-10-31 NOTE — NURSING NOTE
Chief Complaint   Patient presents with    MS    RECHECK     6 month follow up      Vitals were taken and medications were reconciled.   Ben Houston, EMT  1:29 PM

## 2024-01-17 ENCOUNTER — TRANSFERRED RECORDS (OUTPATIENT)
Dept: HEALTH INFORMATION MANAGEMENT | Facility: CLINIC | Age: 74
End: 2024-01-17
Payer: MEDICARE

## 2024-01-29 NOTE — TELEPHONE ENCOUNTER
Action 2024 JTV 10:08 AM    Action Taken [facility] sent an urgent request to [facility] for records and [facility] for images.    MEDICAL RECORDS REQUEST   [facility]  Urology Clinic  [address] [place] [id number]  PHONE: [phone]  Fax: [phone]        FUTURE VISIT INFORMATION                                                   Vivien Gilbert, : 1950 scheduled for future visit at [facility]    APPOINTMENT INFORMATION:  Date: 2024  Provider:  Patsy Aguilar NP  Reason for Visit/Diagnosis: Neurogenic bladder    REFERRAL INFORMATION:  Referring provider:  Saúl Da Silva MD in  MS      RECORDS REQUESTED FOR VISIT                                                     NOTES  STATUS/DETAILS   OFFICE NOTE from referring provider  yes, 10/31/2023 -- Saúl Da Silva MD in  MS   OFFICE NOTE from other specialist  yes, 2024 -- MALA SPIVEY PA @ [facility]  2023 -- VLAD THOMAS PA @ [facility]     MORE   MEDICATION LIST  yes   LABS     URINALYSIS (UA)  yes   NEUROGENIC BLADDER     RENAL/BLADDER ULTRASOUND (IMAGES & REPORT)  YES, [facility]  2023 -- US RENAL   LABS (CMP, RENAL PANEL, CBC)  yes     PRE-VISIT CHECKLIST      Joint diagnostic appointment coordinated correctly          (ensure right order & amount of time) Yes   RECORD COLLECTION COMPLETE YES

## 2024-01-31 ENCOUNTER — PRE VISIT (OUTPATIENT)
Dept: UROLOGY | Facility: CLINIC | Age: 74
End: 2024-01-31
Payer: MEDICARE

## 2024-01-31 NOTE — TELEPHONE ENCOUNTER
Reason for visit: Recurrent UTI's     Relevant information: Neurogenic bladder, MS, performs CIC BID due to retention    Records/imaging/labs/orders: Previously seen at [facility] (records available in EPIC)    Pt called: No need for a call    At Rooming: Standard- cathed [facility]    Amanda Beal CMA  1/31/2024  1:01 PM

## 2024-02-08 NOTE — PROGRESS NOTES
"HPI:  Vivien Gilbert is a 73 year old female w/ neurogenic bladder 2/2 MS with CIC being seen for Zina.      Zina  - She has some burning sensation today and would like to get tested for UTI  - 5 UTIs in the past year, sx include: pale greenish color, oliguria, worse burning pain   -Culture grew Pseudomonas aeruginosa 1/12/24. E coli 12/18/23, e coli 11/23/24, staphylococcus coagulase negative 10/15/23, Pseudomonas aeruginosa 5/20/23  -she was previously on prophylactic keflex, but stopped ~2 year ago due to not having any UTIs for some time. Just started low dose keflex 6 weeks ago.  - also on probiotic and cranberry    NGB  - CIC 2-3 x/ day when going out and before bed, output amount varies, never measured  - She thought that her Zina was from CIC  - on flomax, helps to empty her bladder  - UUI, pads 3x/day, more frequent when holding urine too long. She failed oxybutynin due to constipation  - constipation from neurogenic bowel 2/2 MS, on stool softeners (Colace, Miralax 8.5 g ~ every 3 days), prunes, increased fluid intake  - cr 0.78 3/20/23  - patient reports ROD a few weeks ago was negative for hydronephrosis and stones from [facility]. No EHR found.      Atrophic Vaginitis  - chronic burning pain  - failed estrogen cream due to burning sensation.   - vaginal moistizurers Replens 3-4 days per week started 1 month ago, effective.          Reviewed previous notes from MAYELIN Leonard from [facility]          Exam:  /70   Pulse 56   Ht 1.613 m (5' 3.5\")   Wt 63.5 kg (140 lb)   BMI 24.41 kg/m    General: age-appropriate appearing female in NAD sitting in an exam chair  HEENT: Head AT/NC, EOMI, CN Grossly intact.  Resp: no respiratory distress  Abdomen: Degree of obesity is mild. Abdomen is soft and nontender.   LE: Edema is none   Neuro: grossly non focal. Normal reflexes  Skin: clear of rashes or ecchymoses. No sacral decubitus ulcer.  Motor: excellent strength throughout    Review of " Imaging:  The following imaging exams were independently viewed and interpreted by me and discussed with patient:    Renal US 3/28/23  FINDINGS:     RIGHT KIDNEY: 9.6 x 3.4 x 5.1 cm. Normal without hydronephrosis or masses.     LEFT KIDNEY: 10.7 x 5.2 x 5.0 cm. Normal without hydronephrosis or masses.   Review of Labs:  The following labs were reviewed by me and discussed with the patient:  No results found for this or any previous visit (from the past 720 hour(s)).      Assessment & Plan   Neurogenic bladder 2/2 MS with CIC  Zina  UUI  Atrophic vaginitis    -We discussed that the cause of Zina could be from not doing CIC frequently. Plan is to have her urinate on her own and CIC afterwards q4h. Adjust frequency based on urine output.    - Optimize bowel regimen for constipation. Discussed that constipation could also affect urinary retention. Increase frequency of  Miralax 8.5 g to once daily, okay to hold for diarrhea.    -emphasized hygiene for CIC    -f/u 3 mo w/ BMP,  prefers early may.    - UA/UC today      Patsy Aguilar NP  [facility] UROLOGY [facility] [place]    ==========================      Additional Coding Information:    Problems:  4 -- one or more chronic illnesses with exacerbation or side effects    Data Reviewed  Review of external notes as documented above     Tests ordered: UA/UC, BMP    Level of risk:  3 -- low risk (e.g., OTC medication or observation, minor surgery without risks)    Time spent:  I spent a total of 50 minutes on the day of the visit.   Time spent by me doing chart review, history and exam, documentation and further activities per the note

## 2024-02-09 ENCOUNTER — PRE VISIT (OUTPATIENT)
Dept: UROLOGY | Facility: CLINIC | Age: 74
End: 2024-02-09

## 2024-02-09 ENCOUNTER — OFFICE VISIT (OUTPATIENT)
Dept: UROLOGY | Facility: CLINIC | Age: 74
End: 2024-02-09
Payer: MEDICARE

## 2024-02-09 ENCOUNTER — TELEPHONE (OUTPATIENT)
Dept: UROLOGY | Facility: CLINIC | Age: 74
End: 2024-02-09

## 2024-02-09 VITALS
SYSTOLIC BLOOD PRESSURE: 119 MMHG | DIASTOLIC BLOOD PRESSURE: 70 MMHG | BODY MASS INDEX: 23.9 KG/M2 | WEIGHT: 140 LBS | HEART RATE: 56 BPM | HEIGHT: 64 IN

## 2024-02-09 DIAGNOSIS — N39.0 RECURRENT UTI: ICD-10-CM

## 2024-02-09 DIAGNOSIS — N31.9 NEUROGENIC BLADDER: Primary | ICD-10-CM

## 2024-02-09 DIAGNOSIS — N95.2 ATROPHIC VAGINITIS: ICD-10-CM

## 2024-02-09 DIAGNOSIS — N39.41 URGE INCONTINENCE OF URINE: ICD-10-CM

## 2024-02-09 PROCEDURE — 99204 OFFICE O/P NEW MOD 45 MIN: CPT

## 2024-02-09 RX ORDER — HYDROCHLOROTHIAZIDE 25 MG/1
25 TABLET ORAL DAILY
COMMUNITY

## 2024-02-09 RX ORDER — ATORVASTATIN CALCIUM 40 MG/1
40 TABLET, FILM COATED ORAL DAILY
COMMUNITY

## 2024-02-09 ASSESSMENT — PAIN SCALES - GENERAL: PAINLEVEL: NO PAIN (0)

## 2024-02-09 NOTE — PATIENT INSTRUCTIONS
Urinate and do CIC afterwards every 4 hours    Increase Miralax to 8.5g every day.       Three months follow up in May

## 2024-02-09 NOTE — LETTER
"2/9/2024       RE: Vivien Gilbert  [address] [facility] [address]     Dear Colleague,    Thank you for referring your patient, Vivien Gilbert, to the [facility] [place] at [facility]. Please see a copy of my visit note below.    HPI:  Vivien Gilbert is a 73 year old female w/ neurogenic bladder 2/2 MS with CIC being seen for Zina.      Zina  - She has some burning sensation today and would like to get tested for UTI  - 5 UTIs in the past year, sx include: pale greenish color, oliguria, worse burning pain   -Culture grew Pseudomonas aeruginosa 1/12/24. E coli 12/18/23, e coli 11/23/24, staphylococcus coagulase negative 10/15/23, Pseudomonas aeruginosa 5/20/23  -she was previously on prophylactic keflex, but stopped ~2 year ago due to not having any UTIs for some time. Just started low dose keflex 6 weeks ago.  - also on probiotic and cranberry    NGB  - CIC 2-3 x/ day when going out and before bed, output amount varies, never measured  - She thought that her Zina was from CIC  - on flomax, helps to empty her bladder  - UUI, pads 3x/day, more frequent when holding urine too long. She failed oxybutynin due to constipation  - constipation from neurogenic bowel 2/2 MS, on stool softeners (Colace, Miralax 8.5 g ~ every 3 days), prunes, increased fluid intake  - cr 0.78 3/20/23  - patient reports ROD a few weeks ago was negative for hydronephrosis and stones from [facility]. No EHR found.      Atrophic Vaginitis  - chronic burning pain  - failed estrogen cream due to burning sensation.   - vaginal moistizurers Replens 3-4 days per week started 1 month ago, effective.          Reviewed previous notes from MAYELIN Leonard from [facility]          Exam:  /70   Pulse 56   Ht 1.613 m (5' 3.5\")   Wt 63.5 kg (140 lb)   BMI 24.41 kg/m    General: age-appropriate appearing female in NAD sitting in an exam chair  HEENT: Head " AT/NC, EOMI, CN Grossly intact.  Resp: no respiratory distress  Abdomen: Degree of obesity is mild. Abdomen is soft and nontender.   LE: Edema is none   Neuro: grossly non focal. Normal reflexes  Skin: clear of rashes or ecchymoses. No sacral decubitus ulcer.  Motor: excellent strength throughout    Review of Imaging:  The following imaging exams were independently viewed and interpreted by me and discussed with patient:    Renal US 3/28/23  FINDINGS:     RIGHT KIDNEY: 9.6 x 3.4 x 5.1 cm. Normal without hydronephrosis or masses.     LEFT KIDNEY: 10.7 x 5.2 x 5.0 cm. Normal without hydronephrosis or masses.   Review of Labs:  The following labs were reviewed by me and discussed with the patient:  No results found for this or any previous visit (from the past 720 hour(s)).      Assessment & Plan  Neurogenic bladder 2/2 MS with CIC  Zina  UUI  Atrophic vaginitis    -We discussed that the cause of Zina could be from not doing CIC frequently. Plan is to have her urinate on her own and CIC afterwards q4h. Adjust frequency based on urine output.    - Optimize bowel regimen for constipation. Discussed that constipation could also affect urinary retention. Increase frequency of  Miralax 8.5 g to once daily, okay to hold for diarrhea.    -emphasized hygiene for CIC    -f/u 3 mo w/ BMP,  prefers early may.    - UA/UC today      Patsy Aguilar NP  [facility] UROLOGY CLINIC [place]    ==========================      Additional Coding Information:    Problems:  4 -- one or more chronic illnesses with exacerbation or side effects    Data Reviewed  Review of external notes as documented above     Tests ordered: UA/UC, BMP    Level of risk:  3 -- low risk (e.g., OTC medication or observation, minor surgery without risks)    Time spent:  I spent a total of 50 minutes on the day of the visit.   Time spent by me doing chart review, history and exam, documentation and further activities per the note

## 2024-02-09 NOTE — NURSING NOTE
"Chief Complaint   Patient presents with    Consult For     Neurogenic bladder        Blood pressure 119/70, pulse 56, height 1.613 m (5' 3.5\"), weight 63.5 kg (140 lb). Body mass index is 24.41 kg/m .    Patient Active Problem List   Diagnosis    Chest pain    ACS (acute coronary syndrome) (H)       Allergies   Allergen Reactions    Nickel Rash    Thimerosal (Thiomersal) Itching     Itchy watery eyes with contact solution    Levonorgestrel-Ethinyl Estrad Unknown    Ace Inhibitors Cough    Latex Itching       Current Outpatient Medications   Medication Sig Dispense Refill    alendronate (FOSAMAX) 70 MG tablet Take 70 mg by mouth      aspirin 81 MG chewable tablet Take 81 mg by mouth      atorvastatin (LIPITOR) 40 MG tablet Take 40 mg by mouth daily      buPROPion (WELLBUTRIN SR) 100 MG 12 hr tablet Take 75 mg by mouth       calcium citrate (CITRACAL) 950 (200 Ca) MG tablet 100 mg daily      Cholecalciferol (VITAMIN D3) 2000 units CAPS Take 2,000 Units by mouth      hydrochlorothiazide (HYDRODIURIL) 25 MG tablet Take 25 mg by mouth daily      nitroGLYcerin (NITROSTAT) 0.4 MG sublingual tablet Place 0.4 mg under the tongue      tamsulosin (FLOMAX) 0.4 MG capsule Take 1 capsule every day by oral route.      baclofen (LIORESAL) 10 MG tablet Take 10 mg by mouth 3 times daily      losartan (COZAAR) 50 MG tablet Take 100 mg by mouth       predniSONE (DELTASONE) 50 MG tablet Take 20 tablets (1,000 mg) by mouth every 30 days 60 tablet 3    simvastatin (ZOCOR) 40 MG tablet          Social History     Tobacco Use    Smoking status: Former     Packs/day: 1.00     Years: 10.00     Additional pack years: 0.00     Total pack years: 10.00     Types: Cigarettes     Start date: 1972     Quit date: 1980     Years since quittin.1    Smokeless tobacco: Never   Substance Use Topics    Alcohol use: Yes     Comment: 1 glass of wine daily with dinner    Drug use: No       Benton Hull MA  2024  8:53 AM     "

## 2024-02-09 NOTE — TELEPHONE ENCOUNTER
Talked with Vivien to informed her the Labs as she had discussed of today's appointment has been placed. [facility] Lab number [phone] to call.    Benton CHAVEZ  Non-Medical   Urology Visit Facilitator    02/09/24 10:31 AM

## 2024-02-28 ENCOUNTER — TRANSFERRED RECORDS (OUTPATIENT)
Dept: HEALTH INFORMATION MANAGEMENT | Facility: CLINIC | Age: 74
End: 2024-02-28
Payer: MEDICARE

## 2024-02-29 ENCOUNTER — DOCUMENTATION ONLY (OUTPATIENT)
Dept: NEUROLOGY | Facility: CLINIC | Age: 74
End: 2024-02-29
Payer: MEDICARE

## 2024-02-29 NOTE — PROGRESS NOTES
Plan of care for occupational therapy has been received from [employer], orders have been placed in Dr. Da Silva's folder for review and signature.   Ben Houston EMT 02/29/2024 9:03AM

## 2024-03-05 NOTE — PROGRESS NOTES
Plan of care for occupational therapy has been signed and faxed back at [phone].  Ben Houston EMT 03/05/2024 9:17AM

## 2024-04-22 ENCOUNTER — DOCUMENTATION ONLY (OUTPATIENT)
Dept: NEUROLOGY | Facility: CLINIC | Age: 74
End: 2024-04-22
Payer: MEDICARE

## 2024-04-23 ENCOUNTER — LAB (OUTPATIENT)
Dept: LAB | Facility: CLINIC | Age: 74
End: 2024-04-23
Payer: MEDICARE

## 2024-04-23 DIAGNOSIS — N31.9 NEUROGENIC BLADDER: ICD-10-CM

## 2024-04-23 DIAGNOSIS — N39.41 URGE INCONTINENCE OF URINE: ICD-10-CM

## 2024-04-23 LAB
ALBUMIN UR-MCNC: NEGATIVE MG/DL
APPEARANCE UR: CLEAR
BILIRUB UR QL STRIP: NEGATIVE
COLOR UR AUTO: ABNORMAL
GLUCOSE UR STRIP-MCNC: NEGATIVE MG/DL
HGB UR QL STRIP: NEGATIVE
KETONES UR STRIP-MCNC: NEGATIVE MG/DL
LEUKOCYTE ESTERASE UR QL STRIP: NEGATIVE
MUCOUS THREADS #/AREA URNS LPF: PRESENT /LPF
NITRATE UR QL: NEGATIVE
PH UR STRIP: 7 [PH] (ref 5–7)
RBC URINE: 1 /HPF
SP GR UR STRIP: 1.01 (ref 1–1.03)
SQUAMOUS EPITHELIAL: <1 /HPF
UROBILINOGEN UR STRIP-MCNC: NORMAL MG/DL
WBC URINE: 3 /HPF

## 2024-04-23 PROCEDURE — 81001 URINALYSIS AUTO W/SCOPE: CPT | Performed by: PATHOLOGY

## 2024-04-24 NOTE — PROGRESS NOTES
"HPI:  Vivien Gilbert is a 73 year old female w/ history of Zina being seen for neurogenic bladder 2/2 MS managed by CIC.      - Post void CIC q4h since the last visit, UOP from cath varies. She did not notice anything that could affect her post void CIC output    - No UTIs since the last visit, on Keflex 250 mg since Dec 2023. UTI past sxs include burning, itching, urgency, dysuria, and pus in urine, UTI x6 in 2023. She did CIC 3x/day at that time    - Constipation: on prunes only, effective. She did not tolerate half dose miralax due to diarrhea and colace was not effective    - on flomax, not effective        Reviewed previous notes from MAYELIN Chisholm from [facility]     Exam:  /69 (BP Location: Right arm, Patient Position: Sitting, Cuff Size: Adult Regular)   Pulse 62   Ht 1.613 m (5' 3.5\")   Wt 63.5 kg (140 lb)   SpO2 99%   BMI 24.41 kg/m    General: age-appropriate appearing female in NAD sitting in an exam chair  HEENT: Head AT/NC, EOMI, CN Grossly intact.  Resp: no respiratory distress  CV: heart rate regular  Abdomen: Degree of obesity is none. Abdomen is soft and nontender. No organomegaly.   LE: Edema is none   Neuro: grossly non focal. Normal reflexes  Skin: clear of rashes or ecchymoses.   Motor: walker    Review of Imaging:  The following imaging exams were independently viewed and interpreted by me and discussed with patient:  Renal/Bladder Ultrasound: Normal 3/28/23    Review of Labs:  The following labs were reviewed by me and discussed with the patient:  Recent Results (from the past 720 hour(s))   Routine UA with micro reflex to culture    Collection Time: 04/23/24 12:06 PM    Specimen: Urine, NOS   Result Value Ref Range    Color Urine Light Yellow Colorless, Straw, Light Yellow, Yellow    Appearance Urine Clear Clear    Glucose Urine Negative Negative mg/dL    Bilirubin Urine Negative Negative    Ketones Urine Negative Negative mg/dL    Specific Gravity Urine 1.010 1.003 - 1.035 "    Blood Urine Negative Negative    pH Urine 7.0 5.0 - 7.0    Protein Albumin Urine Negative Negative mg/dL    Urobilinogen Urine Normal Normal, 2.0 mg/dL    Nitrite Urine Negative Negative    Leukocyte Esterase Urine Negative Negative    Mucus Urine Present (A) None Seen /LPF    RBC Urine 1 <=2 /HPF    WBC Urine 3 <=5 /HPF    Squamous Epithelials Urine <1 <=1 /HPF         Assessment & Plan   Neurogenic bladder 2/2 MS  rUTIs    - Discontinue Flomax. Continue with post void CIC q4h    - Take 3-day bladder diary. Measurement cylinder given.    - Discontinue proph daily Keflex. Discussed that if continue to have Zina, could consider bladder irrigation and/or vinegar instillation as the next step.    - ROD     - VV w/ me in 1 month    Patsy Aguilar NP  [facility] UROLOGY CLINIC [place]    ==========================      Additional Coding Information:    Problems:  4 -- two or more stable chronic illnesses      Tests ordered: [facility]      Level of risk:  3 -- low risk (e.g., OTC medication or observation, minor surgery without risks)    Time spent:  I spent a total of 41 minutes on the day of the visit.   Time spent by me doing chart review, history and exam, documentation and further activities per the note

## 2024-04-25 ENCOUNTER — OFFICE VISIT (OUTPATIENT)
Dept: UROLOGY | Facility: CLINIC | Age: 74
End: 2024-04-25
Payer: MEDICARE

## 2024-04-25 VITALS
SYSTOLIC BLOOD PRESSURE: 129 MMHG | DIASTOLIC BLOOD PRESSURE: 69 MMHG | WEIGHT: 140 LBS | BODY MASS INDEX: 23.9 KG/M2 | HEIGHT: 64 IN | HEART RATE: 62 BPM | OXYGEN SATURATION: 99 %

## 2024-04-25 DIAGNOSIS — N31.9 NEUROGENIC BLADDER: Primary | ICD-10-CM

## 2024-04-25 DIAGNOSIS — N39.0 RECURRENT UTI: ICD-10-CM

## 2024-04-25 PROCEDURE — 99215 OFFICE O/P EST HI 40 MIN: CPT

## 2024-04-25 ASSESSMENT — PAIN SCALES - GENERAL: PAINLEVEL: NO PAIN (0)

## 2024-04-25 NOTE — NURSING NOTE
"Chief Complaint   Patient presents with    Follow Up     NGB       Blood pressure 129/69, pulse 62, height 1.613 m (5' 3.5\"), weight 63.5 kg (140 lb), SpO2 99%. Body mass index is 24.41 kg/m .    Patient Active Problem List   Diagnosis    Chest pain    ACS (acute coronary syndrome) (H)       Allergies   Allergen Reactions    Nickel Rash    Thimerosal (Thiomersal) Itching     Itchy watery eyes with contact solution    Levonorgestrel-Ethinyl Estrad Unknown    Ace Inhibitors Cough    Latex Itching       Current Outpatient Medications   Medication Sig Dispense Refill    alendronate (FOSAMAX) 70 MG tablet Take 70 mg by mouth      aspirin 81 MG chewable tablet Take 81 mg by mouth      atorvastatin (LIPITOR) 40 MG tablet Take 40 mg by mouth daily      buPROPion (WELLBUTRIN SR) 100 MG 12 hr tablet Take 75 mg by mouth       calcium citrate (CITRACAL) 950 (200 Ca) MG tablet 100 mg daily      Cholecalciferol (VITAMIN D3) 2000 units CAPS Take 2,000 Units by mouth      hydrochlorothiazide (HYDRODIURIL) 25 MG tablet Take 25 mg by mouth daily      nitroGLYcerin (NITROSTAT) 0.4 MG sublingual tablet Place 0.4 mg under the tongue      tamsulosin (FLOMAX) 0.4 MG capsule Take 1 capsule every day by oral route.      baclofen (LIORESAL) 10 MG tablet Take 10 mg by mouth 3 times daily      losartan (COZAAR) 50 MG tablet Take 100 mg by mouth       predniSONE (DELTASONE) 50 MG tablet Take 20 tablets (1,000 mg) by mouth every 30 days 60 tablet 3    simvastatin (ZOCOR) 40 MG tablet          Social History     Tobacco Use    Smoking status: Former     Current packs/day: 0.00     Average packs/day: 1 pack/day for 10.0 years (10.0 ttl pk-yrs)     Types: Cigarettes     Start date: 1972     Quit date: 1980     Years since quittin.3    Smokeless tobacco: Never   Substance Use Topics    Alcohol use: Yes     Comment: 1 glass of wine daily with dinner    Drug use: No       Elzbieta Mayen LPN  2024  1:01 PM     "

## 2024-04-25 NOTE — PATIENT INSTRUCTIONS
- Discontinue Flomax. Continue with self cath after voiding every 4 hours    - Write down 3-day bladder diary. Measure the urine output from spontaneous voiding AND self cath    - Discontinue Keflex.     - Renal ultrasound at any [place] location whenever you have time to.     - virtual visit with me in 1 month

## 2024-04-25 NOTE — LETTER
"4/25/2024       RE: Vivien Gilbert  [address] [facility] [address]     Dear Colleague,    Thank you for referring your patient, Vivien Gilbert, to the [facility] UROLOGY [facility] [place] at [facility]. Please see a copy of my visit note below.    HPI:  Vivien Gilbert is a 73 year old female w/ history of Zina being seen for neurogenic bladder 2/2 MS managed by CIC.      - Post void CIC q4h since the last visit, UOP from cath varies. She did not notice anything that could affect her post void CIC output    - No UTIs since the last visit, on Keflex 250 mg since Dec 2023. UTI past sxs include burning, itching, urgency, dysuria, and pus in urine, UTI x6 in 2023. She did CIC 3x/day at that time    - Constipation: on prunes only, effective. She did not tolerate half dose miralax due to diarrhea and colace was not effective    - on flomax, not effective        Reviewed previous notes from MAYELIN Chisholm from [facility]     Exam:  /69 (BP Location: Right arm, Patient Position: Sitting, Cuff Size: Adult Regular)   Pulse 62   Ht 1.613 m (5' 3.5\")   Wt 63.5 kg (140 lb)   SpO2 99%   BMI 24.41 kg/m    General: age-appropriate appearing female in NAD sitting in an exam chair  HEENT: Head AT/NC, EOMI, CN Grossly intact.  Resp: no respiratory distress  CV: heart rate regular  Abdomen: Degree of obesity is none. Abdomen is soft and nontender. No organomegaly.   LE: Edema is none   Neuro: grossly non focal. Normal reflexes  Skin: clear of rashes or ecchymoses.   Motor: walker    Review of Imaging:  The following imaging exams were independently viewed and interpreted by me and discussed with patient:  Renal/Bladder Ultrasound: Normal 3/28/23    Review of Labs:  The following labs were reviewed by me and discussed with the patient:  Recent Results (from the past 720 hour(s))   Routine UA with micro reflex to culture    Collection Time: 04/23/24 " 12:06 PM    Specimen: Urine, NOS   Result Value Ref Range    Color Urine Light Yellow Colorless, Straw, Light Yellow, Yellow    Appearance Urine Clear Clear    Glucose Urine Negative Negative mg/dL    Bilirubin Urine Negative Negative    Ketones Urine Negative Negative mg/dL    Specific Gravity Urine 1.010 1.003 - 1.035    Blood Urine Negative Negative    pH Urine 7.0 5.0 - 7.0    Protein Albumin Urine Negative Negative mg/dL    Urobilinogen Urine Normal Normal, 2.0 mg/dL    Nitrite Urine Negative Negative    Leukocyte Esterase Urine Negative Negative    Mucus Urine Present (A) None Seen /LPF    RBC Urine 1 <=2 /HPF    WBC Urine 3 <=5 /HPF    Squamous Epithelials Urine <1 <=1 /HPF         Assessment & Plan  Neurogenic bladder 2/2 MS  rUTIs    - Discontinue Flomax. Continue with post void CIC q4h    - Take 3-day bladder diary. Measurement cylinder given.    - Discontinue proph daily Keflex. Discussed that if continue to have Zina, could consider bladder irrigation and/or vinegar instillation as the next step.    - ROD     - VV w/ me in 1 month    Patsy Aguilar NP  [facility] UROLOGY CLINIC [place]    ==========================      Additional Coding Information:    Problems:  4 -- two or more stable chronic illnesses      Tests ordered: [facility]      Level of risk:  3 -- low risk (e.g., OTC medication or observation, minor surgery without risks)    Time spent:  I spent a total of 41 minutes on the day of the visit.   Time spent by me doing chart review, history and exam, documentation and further activities per the note

## 2024-04-26 ENCOUNTER — PATIENT OUTREACH (OUTPATIENT)
Dept: UROLOGY | Facility: CLINIC | Age: 74
End: 2024-04-26
Payer: MEDICARE

## 2024-04-26 NOTE — PROGRESS NOTES
Call placed to patient and message left after patient called in for suggestions on positions to use for CIC. I suggested she try sitting on the toilet or laying down on the edge of her bed if she has a way to collect it in that position.    Thank you,  Jennifer Vásquez RN, BSN Urology Triage

## 2024-05-01 ENCOUNTER — DOCUMENTATION ONLY (OUTPATIENT)
Dept: UROLOGY | Facility: CLINIC | Age: 74
End: 2024-05-01
Payer: MEDICARE

## 2024-05-01 NOTE — PROGRESS NOTES
Type of Form Received: Order (catheter)    Form Received (Date) 5/1/24   Form Filled out Yes, date 5/1/24   Placed in provider folder Yes       Received Completed forms Yes   Faxed Forms Faxed To: [address] [place] [address]  Fax Number: [phone]   Sent to HIM (Date) 5/13/24

## 2024-05-13 ENCOUNTER — MEDICAL CORRESPONDENCE (OUTPATIENT)
Dept: HEALTH INFORMATION MANAGEMENT | Facility: CLINIC | Age: 74
End: 2024-05-13
Payer: MEDICARE

## 2024-05-20 ENCOUNTER — TELEPHONE (OUTPATIENT)
Dept: UROLOGY | Facility: CLINIC | Age: 74
End: 2024-05-20
Payer: MEDICARE

## 2024-05-20 NOTE — TELEPHONE ENCOUNTER
Health Call Center    Phone Message    May a detailed message be left on voicemail: yes     Reason for Call: Appointment Intake    Referring Provider Name: Cassidy Aguilar  Diagnosis and/or Symptoms: return pt    Pt had to cancel her recent appt with notes wanting to be seen around 5/23 and writer was not able to reschedule (return pt, Video Visit) - nothing pulled up. Also, pt cannot reach out to Jeff in [facility]. Please call pt to schedule. Thank you!    Action Taken: Message routed to:  [facility] ([facility] Urology    Travel Screening: Not Applicable

## 2024-07-01 ENCOUNTER — PRE VISIT (OUTPATIENT)
Dept: UROLOGY | Facility: CLINIC | Age: 74
End: 2024-07-01
Payer: MEDICARE

## 2024-07-01 NOTE — TELEPHONE ENCOUNTER
Reason for visit: Follow up     Relevant information: Bladder diary. Hx of neurogenic bladder, rUTIs, urge incontinence of urine, atrophic vaginitis.    Records/imaging/labs/orders: All records available    Pt called: No need for a call    At Rooming: Standard procedure    Mary Augustine  7/1/2024  3:25 PM

## 2024-10-21 NOTE — TELEPHONE ENCOUNTER
FUTURE VISIT INFORMATION      FUTURE VISIT INFORMATION:  Date: 11/13/24  Time: 9:00am  [place] [facility] [place]ERR[place] INFORMATION:  Referring provider:  Saúl Da Silva MD  Referring providers clinic:   [facility]   Reason for visit/diagnosis  Multiple sclerosis    RECORDS REQUESTED FROM:       Clinic name Comments Records Status Imaging Status    [facility]  MyChart/referral 10/15/24  Ov/notes 10/15/24-11/23/21 epic

## 2024-11-08 ENCOUNTER — TELEPHONE (OUTPATIENT)
Dept: OPHTHALMOLOGY | Facility: CLINIC | Age: 74
End: 2024-11-08
Payer: MEDICARE

## 2024-11-12 DIAGNOSIS — H53.40 VISUAL FIELD DEFECT: Primary | ICD-10-CM

## 2024-11-12 NOTE — PROGRESS NOTES
Vivien Gilbert is a 74 year old female with the following diagnoses:   1. Optic atrophy - Both Eyes    2. Multiple sclerosis (H)    3. Combined forms of age-related cataract of both eyes         Patient was sent for consultation by Dr. Da Silva for multiple sclerosis.     HPI:    Patient follows at the MS clinic for secondary progressive multiple sclerosis. Referral made per patient request. At last visit on October 31, 2023 with Dr. Da Silva, her MS was thought to be stable. At patient's last visit, Dr. Da Silva recommended against surveillance MRI's. Patient was diagnosed with MS in 1987. Soon after diagnosis, patient had an eye exam which demonstrated some optic nerve damage. Patient is not sure which eye was involved. Patient stopped her DMARD about 5 years ago.     Patient scheduled this appointment to make sure that there is no ongoing damage to her eyes as she has felt that her MS has progressed in other parts of her body (e.g. feet and arms are getting more numb, increasing difficulty walking) in the past year. Patient has not noted any vision changes, pain with eye movement, or eye pain in general.     Patient's last eye exam was 4 years ago with Dr. Boyd at [facility].       Independent historians:  Patient    Review of outside testing:    No recent brain imaging - last imaging in 2019.       Review of outside clinical notes:    10/31/2023 -- Visit with Dr. Da Silva    Impression: Multiple sclerosis, secondary progressive, subjectively with slow worsening but objectively basically stable.  I spent 45 minutes on her care on the date of service including chart review and face-to-face time.  We discussed the steroids in light of her osteoporosis.  Her exam has been stable while on the steroids but I told her given the uncertain efficacy at this stage of MS I agree with stopping them and continuing to follow her clinically.  We discussed her memory complaints and cognition and MS  in general.  I showed her the brain [employer] website and recommended she try that.  She has about repeat MRI scanning and we discussed that; I told her I do not think MRI provide any actionable information once the relapsing stage of MS is past, like it certainly is in her.     Plan: Stop steroids.  Follow-up in 1 year.     This note was completed in part using voice-recognition software, and some typographic errors may be present as a result.    Past medical history:    Patient Active Problem List   Diagnosis    Chest pain    ACS (acute coronary syndrome) (H)     Secondary progressive multiple sclerosis       Medications:   alendronate  aspirin  atorvastatin  buPROPion  calcium citrate  hydrochlorothiazide  losartan  simvastatin  vitamin D3 Caps      Family history / social history:  Patient's family history includes Heart Disease in her father; Hypertension in her father; Thyroid Disease in her mother.     Patient  reports that she quit smoking about 44 years ago. Her smoking use included cigarettes. She started smoking about 52 years ago. She has a 10 pack-year smoking history. She has never used smokeless tobacco. She reports current alcohol use. She reports that she does not use drugs.       Exam:  Visual acuity 20/30 -2 right eye 20/25 -2 left eye.  Color vision 10/11 right eye and 10/11 left eye.  Pupils round and reactive with no afferent pupillary defect.  Intraocular pressure 17 right eye and 14 left eye.  Anterior segment exam notable for cataracts.  Fundus exam normal.     Tests ordered and interpreted today:    OCT Retina Spectralis OU (both eyes)          Performed by: TL   . Patient cooperation: Reliable   .     Right Eye  Reliability of the test: Good   . Findings normal/abnormal: Normal OCT   . Interpretation: Normal   . Plan: Monitor   . Interval: Initial   .     Left Eye  Reliability of the test: Good   . Findings normal/abnormal: Normal OCT   . Interpretation: Normal   . Plan: Monitor   . Interval:  Initial   .          Glaucoma Top OU        Component Value Flag Ref Range Units Status    OPHTH MEAN DEVIATION LEFT EYE -4.5       dB     OPHTH MEAN RIGHT EYE -4.1       dB                  Performed by: TL   .     Right Eye  Reliability of the test: Fair   . Findings: Arcuate scotoma, Depressed mean deviation   . MD: -4.1 dB   . Interpretation: Abnormal   . Plan: Monitor   . Interval: Initial   .     Left Eye  Reliability of the test: Good   . Findings: Depressed mean deviation   . Test Findings Free Text: temporal defect   . MD: -4.5 dB   . Interpretation: Abnormal   . Plan: Monitor   . Interval: Initial   .          OCT Optic Nerve RNFL Spectralis OU (both eyes)          Right Eye  Reliability of the test: Good   . Test Findings: Abnormal   . Average Thickness Value: 74   . Interpretation: Superior loss   . Plan: Monitor   . Interval: Initial   .     Left Eye  Reliability of the test: Good   . Test Findings: Abnormal   . Average Thickness Value: 77   . Interpretation: Superior loss   . Plan: Monitor   . Interval: Initial   .            RIGHT EYE        LEFT EYE        Discussion of management / interpretation with another provider:   None    Assessment/Plan:   It is my impression that patient has optic nerve atrophy of BOTH EYES. Patient has a history of multiple sclerosis and reports optic nerve damage at the time of initial diagnosis. It is unclear without prior records whether there has been progression of disease or not. However, there are no signs of active inflammation today and less likely given patient's age that there would be ongoing inflammation from multiple sclerosis. Patient also has not noticed any vision changes. Given a good reason for mild optic atrophy, I will not pursue further work up today.  She has no evidence of an internuclear ophthalmoplegia.  She has upbeat nystagmus in upgaze, likely related to her multiple sclerosis.    Patient also has likely visually significant cataracts. I will  refer the patient to a cataract surgeon.           Attending Physician Attestation:  Complete documentation of historical and exam elements from today's encounter can be found in the full encounter summary report (not reduplicated in this progress note).  I personally obtained the chief complaint(s) and history of present illness.  I confirmed and edited as necessary the review of systems, past medical/surgical history, family history, social history, and examination findings as documented by others; and I examined the patient myself.  I personally reviewed the relevant tests, images, and reports as documented above.  I formulated and edited as necessary the assessment and plan and discussed the findings and management plan with the patient and family. I personally reviewed the ophthalmic test(s) associated with this encounter, agree with the interpretation(s) as documented by the resident/fellow, and have edited the corresponding report(s) as necessary.  - Matheus Soliman MD  PGY-3  Department of Ophthalmology  November 13, 2024 10:40 AM

## 2024-11-13 ENCOUNTER — OFFICE VISIT (OUTPATIENT)
Dept: OPHTHALMOLOGY | Facility: CLINIC | Age: 74
End: 2024-11-13
Payer: COMMERCIAL

## 2024-11-13 ENCOUNTER — PRE VISIT (OUTPATIENT)
Dept: OPHTHALMOLOGY | Facility: CLINIC | Age: 74
End: 2024-11-13

## 2024-11-13 DIAGNOSIS — G35 MULTIPLE SCLEROSIS (H): ICD-10-CM

## 2024-11-13 DIAGNOSIS — H25.813 COMBINED FORMS OF AGE-RELATED CATARACT OF BOTH EYES: ICD-10-CM

## 2024-11-13 DIAGNOSIS — H47.20 OPTIC ATROPHY: Primary | ICD-10-CM

## 2024-11-13 LAB
OPHTH MEAN DEVIATION LEFT EYE: -4.5 DB
OPHTH MEAN DEVIATION RIGHT EYE: -4.1 DB

## 2024-11-13 PROCEDURE — 92083 EXTENDED VISUAL FIELD XM: CPT | Mod: GC | Performed by: OPHTHALMOLOGY

## 2024-11-13 PROCEDURE — 99204 OFFICE O/P NEW MOD 45 MIN: CPT | Mod: GC | Performed by: OPHTHALMOLOGY

## 2024-11-13 PROCEDURE — 92133 CPTRZD OPH DX IMG PST SGM ON: CPT | Mod: GC | Performed by: OPHTHALMOLOGY

## 2024-11-13 PROCEDURE — 99207 OCT RETINA SPECTRALIS OU (BOTH EYE): CPT | Mod: GC | Performed by: OPHTHALMOLOGY

## 2024-11-13 RX ORDER — SIMVASTATIN 40 MG
40 TABLET ORAL AT BEDTIME
COMMUNITY
End: 2024-11-13

## 2024-11-13 RX ORDER — ESTRADIOL 0.1 MG/G
CREAM VAGINAL
COMMUNITY

## 2024-11-13 ASSESSMENT — VISUAL ACUITY
OS_PH_CC: 20/25-2
OD_PH_CC: 20/30-2
CORRECTION_TYPE: GLASSES
OD_CC: 20/50+
METHOD: SNELLEN - LINEAR
OS_CC: 20/30+2

## 2024-11-13 ASSESSMENT — TONOMETRY
OS_IOP_MMHG: 14
IOP_METHOD: ICARE
OD_IOP_MMHG: 17

## 2024-11-13 ASSESSMENT — CONF VISUAL FIELD
OD_INFERIOR_TEMPORAL_RESTRICTION: 0
OD_NORMAL: 1
OD_INFERIOR_NASAL_RESTRICTION: 0
OD_SUPERIOR_TEMPORAL_RESTRICTION: 0
OS_INFERIOR_TEMPORAL_RESTRICTION: 0
METHOD: COUNTING FINGERS
OD_SUPERIOR_NASAL_RESTRICTION: 0
OS_INFERIOR_NASAL_RESTRICTION: 0
OS_SUPERIOR_TEMPORAL_RESTRICTION: 0
OS_SUPERIOR_NASAL_RESTRICTION: 0
OS_NORMAL: 1

## 2024-11-13 ASSESSMENT — REFRACTION_WEARINGRX
OD_CYLINDER: +2.00
OS_AXIS: 001
OS_CYLINDER: +2.50
OD_SPHERE: -5.00
OS_SPHERE: -5.50
OD_ADD: +2.50
OS_ADD: +2.50
OD_AXIS: 001

## 2024-11-13 ASSESSMENT — SLIT LAMP EXAM - LIDS
COMMENTS: NORMAL
COMMENTS: NORMAL

## 2024-11-13 ASSESSMENT — EXTERNAL EXAM - RIGHT EYE: OD_EXAM: NORMAL

## 2024-11-13 ASSESSMENT — CUP TO DISC RATIO
OS_RATIO: 0.2
OD_RATIO: 0.1

## 2024-11-13 ASSESSMENT — EXTERNAL EXAM - LEFT EYE: OS_EXAM: NORMAL

## 2024-11-13 NOTE — LETTER
2024     RE:  :  MRN: Vivien Gilbert  1950  [id number]     Dear Dr. Da Silva    Thank you for asking me to see your very pleasant patient,Vivien Gilbert, in neuro-ophthalmic consultation.  I would like to thank you for sending your records and I have summarized them in the history of present illness.   My assessment and plan are below.  For further details, please see my attached clinic note.      Vivien Gilbert is a 74 year old female with the following diagnoses:   1. Optic atrophy - Both Eyes    2. Multiple sclerosis (H)    3. Combined forms of age-related cataract of both eyes         Patient was sent for consultation by Dr. Da Silva for multiple sclerosis.     HPI:    Patient follows at the MS clinic for secondary progressive multiple sclerosis. Referral made per patient request. At last visit on 2023 with Dr. Da Silva, her MS was thought to be stable. At patient's last visit, Dr. Da Silva recommended against surveillance MRI's. Patient was diagnosed with MS in . Soon after diagnosis, patient had an eye exam which demonstrated some optic nerve damage. Patient is not sure which eye was involved. Patient stopped her DMARD about 5 years ago.     Patient scheduled this appointment to make sure that there is no ongoing damage to her eyes as she has felt that her MS has progressed in other parts of her body (e.g. feet and arms are getting more numb, increasing difficulty walking) in the past year. Patient has not noted any vision changes, pain with eye movement, or eye pain in general.     Patient's last eye exam was 4 years ago with Dr. Boyd at [facility].       Independent historians:  Patient    Review of outside testing:    No recent brain imaging - last imaging in 2019.       Review of outside clinical notes:    10/31/2023 -- Visit with Dr. Da Silva    Impression: Multiple sclerosis, secondary progressive, subjectively with slow worsening but  objectively basically stable.  I spent 45 minutes on her care on the date of service including chart review and face-to-face time.  We discussed the steroids in light of her osteoporosis.  Her exam has been stable while on the steroids but I told her given the uncertain efficacy at this stage of MS I agree with stopping them and continuing to follow her clinically.  We discussed her memory complaints and cognition and MS in general.  I showed her the brain [employer] website and recommended she try that.  She has about repeat MRI scanning and we discussed that; I told her I do not think MRI provide any actionable information once the relapsing stage of MS is past, like it certainly is in her.     Plan: Stop steroids.  Follow-up in 1 year.     This note was completed in part using voice-recognition software, and some typographic errors may be present as a result.    Past medical history:    Patient Active Problem List   Diagnosis    Chest pain    ACS (acute coronary syndrome) (H)     Secondary progressive multiple sclerosis       Medications:   alendronate  aspirin  atorvastatin  buPROPion  calcium citrate  hydrochlorothiazide  losartan  simvastatin  vitamin D3 Caps      Family history / social history:  Patient's family history includes Heart Disease in her father; Hypertension in her father; Thyroid Disease in her mother.     Patient  reports that she quit smoking about 44 years ago. Her smoking use included cigarettes. She started smoking about 52 years ago. She has a 10 pack-year smoking history. She has never used smokeless tobacco. She reports current alcohol use. She reports that she does not use drugs.       Exam:  Visual acuity 20/30 -2 right eye 20/25 -2 left eye.  Color vision 10/11 right eye and 10/11 left eye.  Pupils round and reactive with no afferent pupillary defect.  Intraocular pressure 17 right eye and 14 left eye.  Anterior segment exam notable for cataracts.  Fundus exam normal.     Tests ordered and  interpreted today:    OCT Retina Spectralis OU (both eyes)          Performed by: TL   . Patient cooperation: Reliable   .     Right Eye  Reliability of the test: Good   . Findings normal/abnormal: Normal OCT   . Interpretation: Normal   . Plan: Monitor   . Interval: Initial   .     Left Eye  Reliability of the test: Good   . Findings normal/abnormal: Normal OCT   . Interpretation: Normal   . Plan: Monitor   . Interval: Initial   .          Glaucoma Top OU        Component Value Flag Ref Range Units Status    OPHTH MEAN DEVIATION LEFT EYE -4.5       dB     OPHTH MEAN RIGHT EYE -4.1       dB                  Performed by: TL   .     Right Eye  Reliability of the test: Fair   . Findings: Arcuate scotoma, Depressed mean deviation   . MD: -4.1 dB   . Interpretation: Abnormal   . Plan: Monitor   . Interval: Initial   .     Left Eye  Reliability of the test: Good   . Findings: Depressed mean deviation   . Test Findings Free Text: temporal defect   . MD: -4.5 dB   . Interpretation: Abnormal   . Plan: Monitor   . Interval: Initial   .          OCT Optic Nerve RNFL Spectralis OU (both eyes)          Right Eye  Reliability of the test: Good   . Test Findings: Abnormal   . Average Thickness Value: 74   . Interpretation: Superior loss   . Plan: Monitor   . Interval: Initial   .     Left Eye  Reliability of the test: Good   . Test Findings: Abnormal   . Average Thickness Value: 77   . Interpretation: Superior loss   . Plan: Monitor   . Interval: Initial   .            RIGHT EYE        LEFT EYE        Discussion of management / interpretation with another provider:   None    Assessment/Plan:   It is my impression that patient has optic nerve atrophy of BOTH EYES. Patient has a history of multiple sclerosis and reports optic nerve damage at the time of initial diagnosis. It is unclear without prior records whether there has been progression of disease or not. However, there are no signs of active inflammation today and less likely  given patient's age that there would be ongoing inflammation from multiple sclerosis. Patient also has not noticed any vision changes. Given a good reason for mild optic atrophy, I will not pursue further work up today.  She has no evidence of an internuclear ophthalmoplegia.  She has upbeat nystagmus in upgaze, likely related to her multiple sclerosis.    Patient also has likely visually significant cataracts. I will refer the patient to a cataract surgeon.     Again, thank you for allowing me to participate in the care of your patient.      Sincerely,    Matheus Randall MD  Professor  Director of Neuro-Ophthalmology  Raghav  Scheie Endowed Chair  Departments of Ophthalmology, Neurology, and Neurosurgery  [facility] [address] [place]  [address]  T - [phone]  F - [phone]  [email or web address]      CC: Saúl Da Silva MD  [address] [facility] [facility] [id number]  [facility] [id number]  Via In Basket

## 2024-11-15 NOTE — TELEPHONE ENCOUNTER
FUTURE VISIT INFORMATION      FUTURE VISIT INFORMATION:  Date: 2/25/25  Time: 8:00am  Location: [facility]  REFERRAL INFORMATION:  Referring provider:  Prakash  Referring providers clinic:  [facility]  Reason for visit/diagnosis  CE    RECORDS REQUESTED FROM:       Clinic name Comments Records Status Imaging Status   [facility] Ov/referral 11/13/24 epic

## 2024-11-19 ENCOUNTER — MEDICAL CORRESPONDENCE (OUTPATIENT)
Dept: HEALTH INFORMATION MANAGEMENT | Facility: CLINIC | Age: 74
End: 2024-11-19

## 2024-11-19 ENCOUNTER — DOCUMENTATION ONLY (OUTPATIENT)
Dept: NEUROLOGY | Facility: CLINIC | Age: 74
End: 2024-11-19

## 2024-11-19 ENCOUNTER — OFFICE VISIT (OUTPATIENT)
Dept: NEUROLOGY | Facility: CLINIC | Age: 74
End: 2024-11-19
Attending: PSYCHIATRY & NEUROLOGY
Payer: MEDICARE

## 2024-11-19 VITALS
HEART RATE: 56 BPM | SYSTOLIC BLOOD PRESSURE: 150 MMHG | WEIGHT: 140 LBS | HEIGHT: 63 IN | BODY MASS INDEX: 24.8 KG/M2 | DIASTOLIC BLOOD PRESSURE: 72 MMHG | OXYGEN SATURATION: 99 %

## 2024-11-19 DIAGNOSIS — Z91.81 RISK FOR FALLS: ICD-10-CM

## 2024-11-19 DIAGNOSIS — N31.9 NEUROGENIC BLADDER: Primary | ICD-10-CM

## 2024-11-19 DIAGNOSIS — G35 MS (MULTIPLE SCLEROSIS) (H): ICD-10-CM

## 2024-11-19 PROCEDURE — G0463 HOSPITAL OUTPT CLINIC VISIT: HCPCS | Performed by: PSYCHIATRY & NEUROLOGY

## 2024-11-19 RX ORDER — PREDNISONE 50 MG/1
1000 TABLET ORAL
Qty: 60 TABLET | Refills: 3 | Status: SHIPPED | OUTPATIENT
Start: 2024-11-19

## 2024-11-19 ASSESSMENT — PAIN SCALES - GENERAL: PAINLEVEL_OUTOF10: NO PAIN (0)

## 2024-11-19 NOTE — LETTER
"11/19/2024       RE: Vivien Gilbert  [address] [facility] [address]     Dear Colleague,    Thank you for referring your patient, Vivien Gilbert, to the [facility] [place] at [facility]. Please see a copy of my visit note below.    ID: Vivien Gilbert is a 74-year-old woman who I follow for multiple sclerosis.  She returns for annual follow-up.    HPI: Vivien feels her neurologic function is continuing to gradually worsen.  When I ask if her walking is the same or worse than 1 year ago she says \"much worse\".  She always needs to hold onto something, be at her walker or furniture.  She has occasional falls due to either tripping over the left toes or losing her balance.  She continues intermittent bladder catheterization.  She gets rare extensor spasms in the legs.  I had started her on monthly pulsed steroids a few years ago but they were stopped after the DEXA scan showed worsening bone mineral density.  She feels the pace of her subjective worsening increased after stopping the steroids and wondered about restarting them.  We discussed the pros and cons of that at length and ultimately decided to do so.  She also would like to try physical therapy, says she would like to get strong enough to so that she could be more independent and go \"get a  or take a Pilates class or something\".  She is not currently driving, is awaiting training on hand controls.  [facility]    Past Medical History:   Diagnosis Date     Hypertension     MS      Current Outpatient Medications:      alendronate (FOSAMAX) 70 MG tablet, Take 70 mg by mouth, Disp: , Rfl:      aspirin 81 MG chewable tablet, Take 81 mg by mouth, Disp: , Rfl:      buPROPion (WELLBUTRIN SR) 100 MG 12 hr tablet, Take 75 mg by mouth , Disp: , Rfl:      calcium citrate (CITRACAL) 950 (200 Ca) MG tablet, 100 mg daily, Disp: , " Rfl:      Cholecalciferol (VITAMIN D3) 2000 units CAPS, Take 2,000 Units by mouth, Disp: , Rfl:      estradiol (ESTRACE) 0.1 MG/GM vaginal cream, estradiol 0.01% (0.1 mg/gram) vaginal cream  Apply pea size amount (0.5g)  to the inside of the vagina 3 times per week at night, Disp: , Rfl:      hydrochlorothiazide (HYDRODIURIL) 25 MG tablet, Take 25 mg by mouth daily, Disp: , Rfl:      losartan (COZAAR) 50 MG tablet, Take 100 mg by mouth , Disp: , Rfl:      nitroGLYcerin (NITROSTAT) 0.4 MG sublingual tablet, Place 0.4 mg under the tongue, Disp: , Rfl:      predniSONE (DELTASONE) 50 MG tablet, Take 20 tablets (1,000 mg) by mouth every 30 days., Disp: 60 tablet, Rfl: 3     simvastatin (ZOCOR) 40 MG tablet, , Disp: , Rfl:      atorvastatin (LIPITOR) 40 MG tablet, Take 40 mg by mouth daily, Disp: , Rfl:      baclofen (LIORESAL) 10 MG tablet, Take 10 mg by mouth 3 times daily, Disp: , Rfl:      tamsulosin (FLOMAX) 0.4 MG capsule, Take 1 capsule every day by oral route., Disp: , Rfl:     Exam: She is alert and oriented.  Affect is bright and language functions are normal.  Cranial nerves are unremarkable.  Muscle bulk and tone are normal.  Strength in the arms is normal but hip flexion and ankle dorsiflexion are weak at 4/5 bilaterally.  Knee flexion strength was normal.  Finger-nose-finger and finger tapping were normal but toe tapping is clumsy bilaterally, worse on the left.  Light touch was intact in all 4 limbs.  Reflexes were 2/4 and symmetric at the biceps and knees but asymmetric at the ankles, 2/4 on the right and absent on the left.  She has an ataxic, left hemiparetic gait.  Timed 25 foot walk was done in 10.8 seconds with a wheeled walker, versus 8.9 seconds last year.    Impression: Secondary progressive MS, with subjective ongoing worsening and some objective changes on neurologic exam, mainly a prolongation in time 25 foot walk.  I spent 44 minutes on her care today including chart review, face-to-face and  documentation time.  We discussed the pros and cons of restarting the pulsed corticosteroids and ultimately we both agreed that was a good idea.  She is on alendronate.  I told her about positive clinical results from a trial of tolebrutinib in secondary progressive MS.  I think there is a strong possibility that that will become FDA approved and we will represent a better treatment option than those currently available.    The longitudinal plan of care for the diagnosis(es)/condition(s) as documented were addressed during this visit. Due to the added complexity in care, I will continue to support Vivien in the subsequent management and with ongoing continuity of care.    Plan: Restart prednisone 1000 mg by mouth 1 day/month.  Referral to [facility] for home PT.  Follow-up in 1 year.    This note was completed in part using voice-recognition software, and some typographic errors may be present as a result.       Again, thank you for allowing me to participate in the care of your patient.      Sincerely,    Saúl Da Silva MD

## 2024-11-19 NOTE — NURSING NOTE
Chief Complaint   Patient presents with    MS    RECHECK     MS follow up      Vitals were taken and medications were reconciled.    Ben Houston, EMT  11:53 AM

## 2024-11-19 NOTE — PROGRESS NOTES
"ID: Vivien Gilbert is a 74-year-old woman who I follow for multiple sclerosis.  She returns for annual follow-up.    HPI: Vivien feels her neurologic function is continuing to gradually worsen.  When I ask if her walking is the same or worse than 1 year ago she says \"much worse\".  She always needs to hold onto something, be at her walker or furniture.  She has occasional falls due to either tripping over the left toes or losing her balance.  She continues intermittent bladder catheterization.  She gets rare extensor spasms in the legs.  I had started her on monthly pulsed steroids a few years ago but they were stopped after the DEXA scan showed worsening bone mineral density.  She feels the pace of her subjective worsening increased after stopping the steroids and wondered about restarting them.  We discussed the pros and cons of that at length and ultimately decided to do so.  She also would like to try physical therapy, says she would like to get strong enough to so that she could be more independent and go \"get a  or take a Pilates class or something\".  She is not currently driving, is awaiting training on hand controls.  She requests a referral to for [facility] for home PT.    Past Medical History:   Diagnosis Date    Hypertension     MS      Current Outpatient Medications:     alendronate (FOSAMAX) 70 MG tablet, Take 70 mg by mouth, Disp: , Rfl:     aspirin 81 MG chewable tablet, Take 81 mg by mouth, Disp: , Rfl:     buPROPion (WELLBUTRIN SR) 100 MG 12 hr tablet, Take 75 mg by mouth , Disp: , Rfl:     calcium citrate (CITRACAL) 950 (200 Ca) MG tablet, 100 mg daily, Disp: , Rfl:     Cholecalciferol (VITAMIN D3) 2000 units CAPS, Take 2,000 Units by mouth, Disp: , Rfl:     estradiol (ESTRACE) 0.1 MG/GM vaginal cream, estradiol 0.01% (0.1 mg/gram) vaginal cream  Apply pea size amount (0.5g)  to the inside of the vagina 3 times per week at night, Disp: , Rfl:     hydrochlorothiazide (HYDRODIURIL) 25 MG " tablet, Take 25 mg by mouth daily, Disp: , Rfl:     losartan (COZAAR) 50 MG tablet, Take 100 mg by mouth , Disp: , Rfl:     nitroGLYcerin (NITROSTAT) 0.4 MG sublingual tablet, Place 0.4 mg under the tongue, Disp: , Rfl:     predniSONE (DELTASONE) 50 MG tablet, Take 20 tablets (1,000 mg) by mouth every 30 days., Disp: 60 tablet, Rfl: 3    simvastatin (ZOCOR) 40 MG tablet, , Disp: , Rfl:     atorvastatin (LIPITOR) 40 MG tablet, Take 40 mg by mouth daily, Disp: , Rfl:     baclofen (LIORESAL) 10 MG tablet, Take 10 mg by mouth 3 times daily, Disp: , Rfl:     tamsulosin (FLOMAX) 0.4 MG capsule, Take 1 capsule every day by oral route., Disp: , Rfl:     Exam: She is alert and oriented.  Affect is bright and language functions are normal.  Cranial nerves are unremarkable.  Muscle bulk and tone are normal.  Strength in the arms is normal but hip flexion and ankle dorsiflexion are weak at 4/5 bilaterally.  Knee flexion strength was normal.  Finger-nose-finger and finger tapping were normal but toe tapping is clumsy bilaterally, worse on the left.  Light touch was intact in all 4 limbs.  Reflexes were 2/4 and symmetric at the biceps and knees but asymmetric at the ankles, 2/4 on the right and absent on the left.  She has an ataxic, left hemiparetic gait.  Timed 25 foot walk was done in 10.8 seconds with a wheeled walker, versus 8.9 seconds last year.    Impression: Secondary progressive MS, with subjective ongoing worsening and some objective changes on neurologic exam, mainly a prolongation in time 25 foot walk.  I spent 44 minutes on her care today including chart review, face-to-face and documentation time.  We discussed the pros and cons of restarting the pulsed corticosteroids and ultimately we both agreed that was a good idea.  She is on alendronate.  I told her about positive clinical results from a trial of tolebrutinib in secondary progressive MS.  I think there is a strong possibility that that will become FDA  approved and we will represent a better treatment option than those currently available.    The longitudinal plan of care for the diagnosis(es)/condition(s) as documented were addressed during this visit. Due to the added complexity in care, I will continue to support Vivien in the subsequent management and with ongoing continuity of care.    Plan: Restart prednisone 1000 mg by mouth 1 day/month.  [facility].  Follow-up in 1 year.    This note was completed in part using voice-recognition software, and some typographic errors may be present as a result.

## 2024-12-02 ENCOUNTER — TELEPHONE (OUTPATIENT)
Dept: OPHTHALMOLOGY | Facility: CLINIC | Age: 74
End: 2024-12-02
Payer: MEDICARE

## 2024-12-02 NOTE — TELEPHONE ENCOUNTER
LVM for patient regarding rescheduling current Appointment due to provider Template change. Provided Eye Clinic and direct number for rescheduling options.

## 2024-12-03 ENCOUNTER — TELEPHONE (OUTPATIENT)
Dept: OPHTHALMOLOGY | Facility: CLINIC | Age: 74
End: 2024-12-03
Payer: MEDICARE

## 2024-12-03 NOTE — TELEPHONE ENCOUNTER
Template change -Sent a rescheduled appointment letter.      LVM for patient of rescheduled Appointment details due to Template change. Sent patient a [facility] message and provided Eye Clinic number for any scheduling conflicts.

## 2024-12-03 NOTE — TELEPHONE ENCOUNTER
LVM for patient of rescheduled Appointment details due to Template change. Sent patient a [facility] message and provided [facility] Clinic number for any scheduling conflicts.       Sent a rescheduled appointment letter.

## 2024-12-04 NOTE — TELEPHONE ENCOUNTER
FUTURE VISIT INFORMATION      FUTURE VISIT INFORMATION:  Date: 2/18/25  Time: 8:40am  Location: [facility]  REFERRAL INFORMATION:  Referring provider:  Prakash  Referring providers clinic:  [facility]  Reason for visit/diagnosis  CE     RECORDS REQUESTED FROM:         Clinic name Comments Records Status Imaging Status   [facility] Ov/referral 11/13/24 epic

## 2025-01-07 ENCOUNTER — DOCUMENTATION ONLY (OUTPATIENT)
Dept: NEUROLOGY | Facility: CLINIC | Age: 75
End: 2025-01-07
Payer: MEDICARE

## 2025-01-07 NOTE — PROGRESS NOTES
Initial evaluation for physical therapy has been received from [facility], orders placed in Dr. Da Silva's folder for review and signature.  Ben Houston EMT January 7, 2025

## 2025-01-07 NOTE — PROGRESS NOTES
Initial evaluation for physical therapy has been signed and faxed back at [phone].  Ben Houston EMT January 7, 2025

## 2025-01-16 ENCOUNTER — DOCUMENTATION ONLY (OUTPATIENT)
Dept: NEUROLOGY | Facility: CLINIC | Age: 75
End: 2025-01-16
Payer: MEDICARE

## 2025-01-16 NOTE — PROGRESS NOTES
Letter requesting to use adaptive equipment for the [place] [employer], orders placed in Dr. Da Silva's folder for review.   Ben Houston EMT January 16, 2025

## 2025-02-01 ENCOUNTER — HEALTH MAINTENANCE LETTER (OUTPATIENT)
Age: 75
End: 2025-02-01

## 2025-02-02 ENCOUNTER — DOCUMENTATION ONLY (OUTPATIENT)
Dept: UROLOGY | Facility: CLINIC | Age: 75
End: 2025-02-02
Payer: MEDICARE

## 2025-02-02 NOTE — PROGRESS NOTES
Resubmitted DME order to [employer] via the portal. Pt will need to be seen by 4/25/25 to continue receiving supplies.     TERRANCE YOST RN

## 2025-02-18 ENCOUNTER — PRE VISIT (OUTPATIENT)
Dept: OPHTHALMOLOGY | Facility: CLINIC | Age: 75
End: 2025-02-18

## 2025-02-18 ENCOUNTER — OFFICE VISIT (OUTPATIENT)
Dept: OPHTHALMOLOGY | Facility: CLINIC | Age: 75
End: 2025-02-18
Payer: COMMERCIAL

## 2025-02-18 DIAGNOSIS — H25.813 COMBINED FORMS OF AGE-RELATED CATARACT OF BOTH EYES: Primary | ICD-10-CM

## 2025-02-18 DIAGNOSIS — H47.20 OPTIC ATROPHY: ICD-10-CM

## 2025-02-18 DIAGNOSIS — H52.203 MYOPIC ASTIGMATISM OF BOTH EYES: ICD-10-CM

## 2025-02-18 DIAGNOSIS — H02.88B MEIBOMIAN GLAND DYSFUNCTION (MGD) OF UPPER AND LOWER LIDS OF BOTH EYES: ICD-10-CM

## 2025-02-18 DIAGNOSIS — G35 MULTIPLE SCLEROSIS (H): ICD-10-CM

## 2025-02-18 DIAGNOSIS — H52.13 MYOPIC ASTIGMATISM OF BOTH EYES: ICD-10-CM

## 2025-02-18 DIAGNOSIS — H02.88A MEIBOMIAN GLAND DYSFUNCTION (MGD) OF UPPER AND LOWER LIDS OF BOTH EYES: ICD-10-CM

## 2025-02-18 PROCEDURE — 92015 DETERMINE REFRACTIVE STATE: CPT | Performed by: OPHTHALMOLOGY

## 2025-02-18 PROCEDURE — 99214 OFFICE O/P EST MOD 30 MIN: CPT | Performed by: OPHTHALMOLOGY

## 2025-02-18 PROCEDURE — 76519 ECHO EXAM OF EYE: CPT | Mod: 50 | Performed by: OPHTHALMOLOGY

## 2025-02-18 ASSESSMENT — REFRACTION_WEARINGRX
OS_CYLINDER: +2.50
OS_ADD: +2.50
OD_SPHERE: -5.00
OD_AXIS: 001
OD_ADD: +2.50
OS_AXIS: 001
OS_SPHERE: -5.50
OD_CYLINDER: +2.00

## 2025-02-18 ASSESSMENT — VISUAL ACUITY
OS_CC+: +2
OD_PH_CC+: -2
CORRECTION_TYPE: GLASSES
OD_CC+: +2
OD_PH_CC: 20/30
METHOD: SNELLEN - LINEAR
OD_CC: J2-2
OS_CC: 20/30-1
OS_CC: J2-3
OS_BAT_HIGH: 20/125
OD_CC: 20/50
OD_BAT_HIGH: 20/100-

## 2025-02-18 ASSESSMENT — EXTERNAL EXAM - LEFT EYE: OS_EXAM: NORMAL

## 2025-02-18 ASSESSMENT — REFRACTION_MANIFEST
OS_CYLINDER: +2.75
OD_CYLINDER: +2.25
OD_SPHERE: -5.25
OS_SPHERE: -5.00
OD_ADD: +2.75
OD_AXIS: 001
OS_AXIS: 001
OS_ADD: +2.75

## 2025-02-18 ASSESSMENT — TONOMETRY
IOP_METHOD: ICARE
OS_IOP_MMHG: 13
OD_IOP_MMHG: 18

## 2025-02-18 ASSESSMENT — CONF VISUAL FIELD
OS_SUPERIOR_TEMPORAL_RESTRICTION: 0
OD_INFERIOR_TEMPORAL_RESTRICTION: 0
OD_SUPERIOR_TEMPORAL_RESTRICTION: 0
OS_NORMAL: 1
OD_NORMAL: 1
OS_SUPERIOR_NASAL_RESTRICTION: 0
OD_SUPERIOR_NASAL_RESTRICTION: 0
OD_INFERIOR_NASAL_RESTRICTION: 0
OS_INFERIOR_TEMPORAL_RESTRICTION: 0
METHOD: COUNTING FINGERS
OS_INFERIOR_NASAL_RESTRICTION: 0

## 2025-02-18 ASSESSMENT — EXTERNAL EXAM - RIGHT EYE: OD_EXAM: NORMAL

## 2025-02-18 ASSESSMENT — SLIT LAMP EXAM - LIDS
COMMENTS: NORMAL
COMMENTS: NORMAL

## 2025-02-18 ASSESSMENT — CUP TO DISC RATIO
OS_RATIO: 0.2
OD_RATIO: 0.1

## 2025-02-18 NOTE — PROGRESS NOTES
CC: Comprehensive eye exam     HPI:  Vivien Gilbert is a 74 year old female here for cataract evaluation.  She fees she is seeing ok with current glasses but maybe a little more blurry over the last few years.    HPI       Cataract Evaluation    In both eyes.  Associated symptoms include blurred vision and haloes.  Treatments tried include no treatments.  Pain was noted as 0/10. Additional comments: Vivien Gilbert is being seen for a consult today by the request of Dr. Randall for due to Cataracts each eye.              Comments    History includes Optic atrophy - Both Eyes and Multiple sclerosis.   Patient state that vision each eye has declined gradually over the past 2+ year with each eye. Reading road signs needs to get closer.   Computer and reading. Has noticed needing to take glasses off read. Glare is a bother with each eye making it harder to focus.   States she would like glasses despite the possibility Cataracts may need to be addressed. States due to the MS and balance being off vision is really important due to fall risks.   POOL Knowles COT 8:35 AM February 18, 2025                     Last edited by Irma Coy COT on 2/18/2025  8:36 AM.        PMH:    Past Medical History:   Diagnosis Date    Hypertension       Diagnosed with secondary progressive MS in 1987.  Soon after diagnosis, patient had an eye exam which demonstrated some optic nerve damage. Patient is not sure which eye was involved. Patient stopped her DMARD about 5 years ago, followed by Dr. Da Silva.     Patient Active Problem List   Diagnosis    Chest pain    ACS (acute coronary syndrome) (H)       Medications:   alendronate  aspirin  atorvastatin  buPROPion  calcium citrate  hydrochlorothiazide  losartan  simvastatin  vitamin D3 Caps    POH: MS associated mild optic atrophy, no evidence of an internuclear ophthalmoplegia.  Upbeat nystagmus in upgaze, likely related to her multiple sclerosis, cataracts,  myopia both eyes      Reviewed prior OCT macula with patient and notes from Dr. Randall    Assessment & Plan       1. Combined forms of age-related cataract of both eyes - Both Eyes    2. Myopic astigmatism of both eyes - Both Eyes    3. Meibomian gland dysfunction (MGD) of upper and lower lids of both eyes - Both Eyes    4. Multiple sclerosis (H) - Both Eyes    5. Optic atrophy - Both Eyes        Combined cataracts both eyes   Myopic astigmatism both eyes   Comment: visually significant both eyes, but patient meeting visual needs with glasses, she would like to try updating them for now and will call if vision worsens and she is wanting to proceed with cataract surgery.  We discussed surgery and refractive goals for her to consider (possibly would want to remain mildly myopic)    Plan: IOL master done, adequate, on file  Reassess cataracts in 1 year, sooner as needed.  Manifest refraction done and prescription for glasses given     (H02.88A,  H02.88B) Meibomian gland dysfunction (MGD) of upper and lower lids of both eyes - Both Eyes  Comment: mild   High computer use, former   Plan: natural history discussed with patient   Warm compresses daily  Artificial tears 4-6 times per day as needed for discomfort    (G35) Multiple sclerosis (H) - Both Eyes  (H47.20) Optic atrophy - Both Eyes  Comment: not felt to be active/progressing per last neuroopthalmology exam with Dr. Randall  Plan: observe       Patient disposition:   Return in about 1 year (around 2/18/2026) for Comprehensive Exam- cataracts. Call for sooner appointment as needed.      Complete documentation of historical and exam elements from today's encounter can be found in the full encounter summary report (not reduplicated in this progress note). I personally obtained the chief complaint(s) and history of present illness.  I have confirmed and edited as necessary the CC, HPI, PMH/PSH, social history, FMH, ROS, and exam/neuro findings as obtained by the technician or others. I  have examined this patient myself and I personally viewed the image(s) and studies listed above and the documentation reflects my findings and interpretation.  I formulated and edited as necessary the assessment and plan and discussed the findings and management plan with the patient and family.     Jaymie New MD

## 2025-02-18 NOTE — NURSING NOTE
Chief Complaints and History of Present Illnesses   Patient presents with    Cataract Evaluation     Vivien Gilbert is being seen for a consult today by the request of Dr. Randall for due to Cataracts each eye.      Chief Complaint(s) and History of Present Illness(es)       Cataract Evaluation              Laterality: both eyes    Associated symptoms: blurred vision and haloes    Treatments tried: no treatments    Pain scale: 0/10    Comments: Vivien Gilbert is being seen for a consult today by the request of Dr. Randall for due to Cataracts each eye.               Comments    History includes Optic atrophy - Both Eyes and Multiple sclerosis.   Patient state that vision each eye has declined gradually over the past 2+ year with each eye. Reading road signs needs to get closer.   Computer and reading. Has noticed needing to take glasses off read. Glare is a bother with each eye making it harder to focus.   States she would like glasses despite the possibility Cataracts may need to be addressed.   Irma Coy, COT COT 8:35 AM February 18, 2025

## 2025-02-18 NOTE — NURSING NOTE
Chief Complaints and History of Present Illnesses   Patient presents with    Cataract Evaluation     Vivien Gilbert is being seen for a consult today by the request of Dr. Randall for due to Cataracts each eye.      Chief Complaint(s) and History of Present Illness(es)       Cataract Evaluation              Laterality: both eyes    Associated symptoms: blurred vision and haloes    Treatments tried: no treatments    Pain scale: 0/10    Comments: Vivien Gilbert is being seen for a consult today by the request of Dr. Randall for due to Cataracts each eye.               Comments    History includes Optic atrophy - Both Eyes and Multiple sclerosis.   Patient state that vision each eye has declined gradually over the past 2+ year with each eye. Reading road signs needs to get closer.   Computer and reading. Has noticed needing to take glasses off read. Glare is a bother with each eye making it harder to focus.   States she would like glasses despite the possibility Cataracts may need to be addressed. States due to the MS and balance being off vision is really important due to fall risks.   Irma Coy, COT COT 8:35 AM February 18, 2025

## 2025-02-25 ENCOUNTER — PRE VISIT (OUTPATIENT)
Dept: OPHTHALMOLOGY | Facility: CLINIC | Age: 75
End: 2025-02-25

## (undated) RX ORDER — NITROGLYCERIN 5 MG/ML
VIAL (ML) INTRAVENOUS
Status: DISPENSED
Start: 2018-04-14

## (undated) RX ORDER — LIDOCAINE HYDROCHLORIDE 10 MG/ML
INJECTION, SOLUTION EPIDURAL; INFILTRATION; INTRACAUDAL; PERINEURAL
Status: DISPENSED
Start: 2018-04-14

## (undated) RX ORDER — FENTANYL CITRATE 50 UG/ML
INJECTION, SOLUTION INTRAMUSCULAR; INTRAVENOUS
Status: DISPENSED
Start: 2018-04-14